# Patient Record
Sex: FEMALE | Race: WHITE | Employment: OTHER | URBAN - METROPOLITAN AREA
[De-identification: names, ages, dates, MRNs, and addresses within clinical notes are randomized per-mention and may not be internally consistent; named-entity substitution may affect disease eponyms.]

---

## 2017-04-20 PROBLEM — R07.9 CHEST PAIN: Status: ACTIVE | Noted: 2017-04-20

## 2017-04-20 PROBLEM — E03.9 HYPOTHYROID: Status: ACTIVE | Noted: 2017-04-20

## 2017-04-20 PROBLEM — I25.10 CAD (CORONARY ARTERY DISEASE): Status: ACTIVE | Noted: 2017-04-20

## 2017-04-20 PROBLEM — E66.9 OBESITY: Status: ACTIVE | Noted: 2017-04-20

## 2017-04-20 PROBLEM — E78.5 HLD (HYPERLIPIDEMIA): Status: ACTIVE | Noted: 2017-04-20

## 2019-11-27 ENCOUNTER — HOSPITAL ENCOUNTER (OUTPATIENT)
Dept: OPERATING ROOM | Age: 69
Setting detail: OUTPATIENT SURGERY
Discharge: HOME | End: 2019-11-27
Payer: MEDICARE

## 2019-11-27 VITALS
HEART RATE: 85 BPM | OXYGEN SATURATION: 99 % | RESPIRATION RATE: 16 BRPM | DIASTOLIC BLOOD PRESSURE: 83 MMHG | SYSTOLIC BLOOD PRESSURE: 139 MMHG

## 2019-11-27 PROCEDURE — 2500000003 HC RX 250 WO HCPCS: Performed by: OPHTHALMOLOGY

## 2019-11-27 PROCEDURE — 6370000000 HC RX 637 (ALT 250 FOR IP): Performed by: OPHTHALMOLOGY

## 2019-11-27 PROCEDURE — 65855 TRABECULOPLASTY LASER SURG: CPT

## 2019-11-27 RX ORDER — PILOCARPINE HYDROCHLORIDE 20 MG/ML
1 SOLUTION/ DROPS OPHTHALMIC ONCE
Status: COMPLETED | OUTPATIENT
Start: 2019-11-27 | End: 2019-11-27

## 2019-11-27 RX ORDER — PROPARACAINE HYDROCHLORIDE 5 MG/ML
1 SOLUTION/ DROPS OPHTHALMIC ONCE
Status: COMPLETED | OUTPATIENT
Start: 2019-11-27 | End: 2019-11-27

## 2019-11-27 RX ADMIN — APRACLONIDINE HYDROCHLORIDE 1 DROP: 10 SOLUTION/ DROPS OPHTHALMIC at 12:24

## 2019-11-27 RX ADMIN — PILOCARPINE HYDROCHLORIDE 1 DROP: 20 SOLUTION/ DROPS OPHTHALMIC at 12:25

## 2019-11-27 RX ADMIN — PROPARACAINE HYDROCHLORIDE 1 DROP: 5 SOLUTION/ DROPS OPHTHALMIC at 13:09

## 2019-11-27 RX ADMIN — BROMFENAC SODIUM 1 DROP: 0.7 SOLUTION/ DROPS OPHTHALMIC at 13:14

## 2019-12-11 ENCOUNTER — HOSPITAL ENCOUNTER (OUTPATIENT)
Dept: OPERATING ROOM | Age: 69
Setting detail: OUTPATIENT SURGERY
Discharge: HOME | End: 2019-12-11
Payer: MEDICARE

## 2019-12-11 VITALS
SYSTOLIC BLOOD PRESSURE: 125 MMHG | HEART RATE: 84 BPM | OXYGEN SATURATION: 95 % | DIASTOLIC BLOOD PRESSURE: 82 MMHG | RESPIRATION RATE: 18 BRPM

## 2019-12-11 PROCEDURE — 65855 TRABECULOPLASTY LASER SURG: CPT

## 2019-12-11 PROCEDURE — 6370000000 HC RX 637 (ALT 250 FOR IP): Performed by: OPHTHALMOLOGY

## 2019-12-11 PROCEDURE — 2500000003 HC RX 250 WO HCPCS: Performed by: OPHTHALMOLOGY

## 2019-12-11 RX ORDER — PILOCARPINE HYDROCHLORIDE 20 MG/ML
1 SOLUTION/ DROPS OPHTHALMIC ONCE
Status: COMPLETED | OUTPATIENT
Start: 2019-12-11 | End: 2019-12-11

## 2019-12-11 RX ORDER — PROPARACAINE HYDROCHLORIDE 5 MG/ML
1 SOLUTION/ DROPS OPHTHALMIC ONCE
Status: COMPLETED | OUTPATIENT
Start: 2019-12-11 | End: 2019-12-11

## 2019-12-11 RX ADMIN — PILOCARPINE HYDROCHLORIDE 1 DROP: 20 SOLUTION/ DROPS OPHTHALMIC at 14:11

## 2019-12-11 RX ADMIN — PROPARACAINE HYDROCHLORIDE 1 DROP: 5 SOLUTION/ DROPS OPHTHALMIC at 14:28

## 2019-12-11 RX ADMIN — BROMFENAC SODIUM 1 DROP: 0.7 SOLUTION/ DROPS OPHTHALMIC at 14:33

## 2019-12-11 RX ADMIN — APRACLONIDINE HYDROCHLORIDE 1 DROP: 10 SOLUTION/ DROPS OPHTHALMIC at 14:05

## 2025-02-18 ENCOUNTER — OFFICE VISIT (OUTPATIENT)
Age: 75
End: 2025-02-18

## 2025-02-18 VITALS
TEMPERATURE: 97.1 F | SYSTOLIC BLOOD PRESSURE: 122 MMHG | OXYGEN SATURATION: 95 % | DIASTOLIC BLOOD PRESSURE: 68 MMHG | HEART RATE: 110 BPM

## 2025-02-18 DIAGNOSIS — J20.9 ACUTE BRONCHITIS, UNSPECIFIED ORGANISM: Primary | ICD-10-CM

## 2025-02-18 DIAGNOSIS — R05.1 ACUTE COUGH: ICD-10-CM

## 2025-02-18 DIAGNOSIS — J06.9 ACUTE UPPER RESPIRATORY INFECTION: ICD-10-CM

## 2025-02-18 RX ORDER — ROSUVASTATIN CALCIUM 20 MG/1
20 TABLET, COATED ORAL EVERY EVENING
Status: ON HOLD | COMMUNITY
Start: 2025-02-06 | End: 2025-08-05

## 2025-02-18 RX ORDER — BENZONATATE 100 MG/1
100-200 CAPSULE ORAL 3 TIMES DAILY PRN
Qty: 60 CAPSULE | Refills: 0 | Status: ON HOLD | OUTPATIENT
Start: 2025-02-18 | End: 2025-02-28

## 2025-02-18 RX ORDER — PREDNISONE 20 MG/1
20 TABLET ORAL 2 TIMES DAILY
Qty: 10 TABLET | Refills: 0 | Status: ON HOLD | OUTPATIENT
Start: 2025-02-18 | End: 2025-02-23

## 2025-02-18 RX ORDER — TIRZEPATIDE 5 MG/.5ML
INJECTION, SOLUTION SUBCUTANEOUS
Status: ON HOLD | COMMUNITY
Start: 2025-01-16

## 2025-02-18 RX ORDER — OXYCODONE HYDROCHLORIDE 5 MG/1
TABLET ORAL
Status: ON HOLD | COMMUNITY
Start: 2024-12-19

## 2025-02-18 RX ORDER — TRAZODONE HYDROCHLORIDE 50 MG/1
TABLET ORAL
Status: ON HOLD | COMMUNITY
Start: 2025-01-18

## 2025-02-18 RX ORDER — BRIMONIDINE TARTRATE 2 MG/ML
SOLUTION/ DROPS OPHTHALMIC
Status: ON HOLD | COMMUNITY
Start: 2024-12-17

## 2025-02-18 RX ORDER — LATANOPROST 50 UG/ML
SOLUTION/ DROPS OPHTHALMIC
Status: ON HOLD | COMMUNITY

## 2025-02-18 ASSESSMENT — ENCOUNTER SYMPTOMS
EYES NEGATIVE: 1
GASTROINTESTINAL NEGATIVE: 1
RHINORRHEA: 1
COUGH: 1

## 2025-02-18 NOTE — PROGRESS NOTES
Muriel Thomas (: 1950) is a 74 y.o. female, New patient, here for evaluation of the following chief complaint(s):  Cough (Pt states cough x 10 days now/Productive cough/Short of breath with coughing ) and Nasal Congestion        ASSESSMENT/PLAN:    ICD-10-CM    1. Acute bronchitis, unspecified organism  J20.9 amoxicillin-clavulanate (AUGMENTIN) 875-125 MG per tablet     predniSONE (DELTASONE) 20 MG tablet      2. Acute cough  R05.1 benzonatate (TESSALON) 100 MG capsule      3. Acute upper respiratory infection  J06.9           Acute Bronchitis  Augmentin prescribed to treat the URI - take with food  Prednisone prescribed to help with the airway restriction  Benzonatate prescribed to help with cough  Suggested OTC remedies for symptom management:  Discussed PCP follow up for persisting or worsening symptoms, or to return to the clinic if unable to obtain PCP follow up for worsening symptoms.  The patient tolerated their visit well. A time was given to answer questions and a plan was established, proposed, and was agreed upon. Reviewed AVS with treatment instructions and answered questions - patient acknowledges understanding and agreement with the discussed treatment plan and AVS instructions.      SUBJECTIVE/OBJECTIVE:  HPI:   74 y.o. female presents for complaint of cough x 10 days.    Admits cough has been consistent and productive since symptoms began. Did have a fever at beginning of illness. Cough has continued and now voice is very hoarse. Productive cough with green/yellow sputum.   Denies current fever, nausea, vomiting, shortness of breath, chest pain.     Mucinex makes symptoms better.  Talking, laying down makes symptoms worse.    Has attempted Mucinex for symptoms with mild improvement    Pt provided HPI by themself - pt considered to be a reliable historian.        History provided by:  Patient  Cough  This is a new problem. The current episode started 1 to 4 weeks ago. The problem has been

## 2025-02-20 ENCOUNTER — OFFICE VISIT (OUTPATIENT)
Age: 75
End: 2025-02-20

## 2025-02-20 ENCOUNTER — HOSPITAL ENCOUNTER (INPATIENT)
Age: 75
LOS: 10 days | Discharge: HOME | DRG: 871 | End: 2025-03-02
Attending: STUDENT IN AN ORGANIZED HEALTH CARE EDUCATION/TRAINING PROGRAM | Admitting: STUDENT IN AN ORGANIZED HEALTH CARE EDUCATION/TRAINING PROGRAM
Payer: MEDICARE

## 2025-02-20 ENCOUNTER — APPOINTMENT (OUTPATIENT)
Dept: GENERAL RADIOLOGY | Age: 75
DRG: 871 | End: 2025-02-20
Payer: MEDICARE

## 2025-02-20 VITALS
SYSTOLIC BLOOD PRESSURE: 138 MMHG | TEMPERATURE: 97.2 F | HEART RATE: 107 BPM | OXYGEN SATURATION: 84 % | DIASTOLIC BLOOD PRESSURE: 81 MMHG

## 2025-02-20 DIAGNOSIS — R06.02 SHORTNESS OF BREATH: ICD-10-CM

## 2025-02-20 DIAGNOSIS — J96.01 ACUTE RESPIRATORY FAILURE WITH HYPOXIA (HCC): ICD-10-CM

## 2025-02-20 DIAGNOSIS — Z71.89 COMPLEX CARE COORDINATION: ICD-10-CM

## 2025-02-20 DIAGNOSIS — J18.9 PNEUMONIA DUE TO INFECTIOUS ORGANISM, UNSPECIFIED LATERALITY, UNSPECIFIED PART OF LUNG: Primary | ICD-10-CM

## 2025-02-20 DIAGNOSIS — R09.02 HYPOXEMIA: Primary | ICD-10-CM

## 2025-02-20 PROBLEM — J15.9 COMMUNITY ACQUIRED BACTERIAL PNEUMONIA: Status: ACTIVE | Noted: 2025-02-20

## 2025-02-20 LAB
ALBUMIN SERPL-MCNC: 3.1 G/DL (ref 3.4–5)
ALBUMIN/GLOB SERPL: 0.7 {RATIO} (ref 1.1–2.2)
ALP SERPL-CCNC: 127 U/L (ref 40–129)
ALT SERPL-CCNC: 144 U/L (ref 10–40)
ANION GAP SERPL CALCULATED.3IONS-SCNC: 10 MMOL/L (ref 3–16)
ANISOCYTOSIS BLD QL SMEAR: ABNORMAL
AST SERPL-CCNC: 162 U/L (ref 15–37)
BASOPHILS # BLD: 0 K/UL (ref 0–0.2)
BASOPHILS NFR BLD: 0 %
BILIRUB SERPL-MCNC: 0.3 MG/DL (ref 0–1)
BUN SERPL-MCNC: 14 MG/DL (ref 7–20)
CALCIUM SERPL-MCNC: 8.9 MG/DL (ref 8.3–10.6)
CHLORIDE SERPL-SCNC: 97 MMOL/L (ref 99–110)
CO2 SERPL-SCNC: 28 MMOL/L (ref 21–32)
CREAT SERPL-MCNC: 0.8 MG/DL (ref 0.6–1.2)
DEPRECATED RDW RBC AUTO: 12.9 % (ref 12.4–15.4)
EOSINOPHIL # BLD: 0 K/UL (ref 0–0.6)
EOSINOPHIL NFR BLD: 0 %
FLUAV RNA RESP QL NAA+PROBE: NOT DETECTED
FLUBV RNA RESP QL NAA+PROBE: NOT DETECTED
GFR SERPLBLD CREATININE-BSD FMLA CKD-EPI: 78 ML/MIN/{1.73_M2}
GLUCOSE SERPL-MCNC: 112 MG/DL (ref 70–99)
HCT VFR BLD AUTO: 39.7 % (ref 36–48)
HGB BLD-MCNC: 13.5 G/DL (ref 12–16)
LACTATE BLDV-SCNC: 1 MMOL/L (ref 0.4–1.9)
LACTATE BLDV-SCNC: 1.4 MMOL/L (ref 0.4–1.9)
LYMPHOCYTES # BLD: 1.2 K/UL (ref 1–5.1)
LYMPHOCYTES NFR BLD: 8 %
MACROCYTES BLD QL SMEAR: ABNORMAL
MCH RBC QN AUTO: 32.2 PG (ref 26–34)
MCHC RBC AUTO-ENTMCNC: 34.1 G/DL (ref 31–36)
MCV RBC AUTO: 94.5 FL (ref 80–100)
MICROCYTES BLD QL SMEAR: ABNORMAL
MONOCYTES # BLD: 0.7 K/UL (ref 0–1.3)
MONOCYTES NFR BLD: 5 %
NEUTROPHILS # BLD: 11.8 K/UL (ref 1.7–7.7)
NEUTROPHILS NFR BLD: 73 %
NEUTS BAND NFR BLD MANUAL: 13 % (ref 0–7)
OVALOCYTES BLD QL SMEAR: ABNORMAL
PLATELET # BLD AUTO: 626 K/UL (ref 135–450)
PLATELET BLD QL SMEAR: ABNORMAL
PMV BLD AUTO: 6.8 FL (ref 5–10.5)
POIKILOCYTOSIS BLD QL SMEAR: ABNORMAL
POLYCHROMASIA BLD QL SMEAR: ABNORMAL
POTASSIUM SERPL-SCNC: 4.2 MMOL/L (ref 3.5–5.1)
PROT SERPL-MCNC: 7.3 G/DL (ref 6.4–8.2)
RBC # BLD AUTO: 4.21 M/UL (ref 4–5.2)
SARS-COV-2 RNA RESP QL NAA+PROBE: NOT DETECTED
SLIDE REVIEW: ABNORMAL
SODIUM SERPL-SCNC: 135 MMOL/L (ref 136–145)
SPHEROCYTES BLD QL SMEAR: ABNORMAL
TROPONIN, HIGH SENSITIVITY: 10 NG/L (ref 0–14)
VARIANT LYMPHS NFR BLD MANUAL: 1 % (ref 0–6)
WBC # BLD AUTO: 13.7 K/UL (ref 4–11)

## 2025-02-20 PROCEDURE — 2500000003 HC RX 250 WO HCPCS: Performed by: STUDENT IN AN ORGANIZED HEALTH CARE EDUCATION/TRAINING PROGRAM

## 2025-02-20 PROCEDURE — 6370000000 HC RX 637 (ALT 250 FOR IP): Performed by: STUDENT IN AN ORGANIZED HEALTH CARE EDUCATION/TRAINING PROGRAM

## 2025-02-20 PROCEDURE — 87040 BLOOD CULTURE FOR BACTERIA: CPT

## 2025-02-20 PROCEDURE — 6360000002 HC RX W HCPCS: Performed by: STUDENT IN AN ORGANIZED HEALTH CARE EDUCATION/TRAINING PROGRAM

## 2025-02-20 PROCEDURE — 1200000000 HC SEMI PRIVATE

## 2025-02-20 PROCEDURE — 2580000003 HC RX 258: Performed by: STUDENT IN AN ORGANIZED HEALTH CARE EDUCATION/TRAINING PROGRAM

## 2025-02-20 PROCEDURE — 71045 X-RAY EXAM CHEST 1 VIEW: CPT

## 2025-02-20 PROCEDURE — 87636 SARSCOV2 & INF A&B AMP PRB: CPT

## 2025-02-20 PROCEDURE — 80053 COMPREHEN METABOLIC PANEL: CPT

## 2025-02-20 PROCEDURE — 87150 DNA/RNA AMPLIFIED PROBE: CPT

## 2025-02-20 PROCEDURE — 36415 COLL VENOUS BLD VENIPUNCTURE: CPT

## 2025-02-20 PROCEDURE — 99285 EMERGENCY DEPT VISIT HI MDM: CPT

## 2025-02-20 PROCEDURE — 93005 ELECTROCARDIOGRAM TRACING: CPT | Performed by: STUDENT IN AN ORGANIZED HEALTH CARE EDUCATION/TRAINING PROGRAM

## 2025-02-20 PROCEDURE — 87070 CULTURE OTHR SPECIMN AEROBIC: CPT

## 2025-02-20 PROCEDURE — 84484 ASSAY OF TROPONIN QUANT: CPT

## 2025-02-20 PROCEDURE — 85025 COMPLETE CBC W/AUTO DIFF WBC: CPT

## 2025-02-20 PROCEDURE — 83605 ASSAY OF LACTIC ACID: CPT

## 2025-02-20 PROCEDURE — 94640 AIRWAY INHALATION TREATMENT: CPT

## 2025-02-20 RX ORDER — IPRATROPIUM BROMIDE AND ALBUTEROL SULFATE 2.5; .5 MG/3ML; MG/3ML
1 SOLUTION RESPIRATORY (INHALATION) ONCE
Status: COMPLETED | OUTPATIENT
Start: 2025-02-20 | End: 2025-02-20

## 2025-02-20 RX ORDER — SODIUM CHLORIDE 9 MG/ML
INJECTION, SOLUTION INTRAVENOUS PRN
Status: DISCONTINUED | OUTPATIENT
Start: 2025-02-20 | End: 2025-03-02 | Stop reason: HOSPADM

## 2025-02-20 RX ORDER — ONDANSETRON 2 MG/ML
4 INJECTION INTRAMUSCULAR; INTRAVENOUS EVERY 6 HOURS PRN
Status: DISCONTINUED | OUTPATIENT
Start: 2025-02-20 | End: 2025-03-02 | Stop reason: HOSPADM

## 2025-02-20 RX ORDER — ENOXAPARIN SODIUM 100 MG/ML
30 INJECTION SUBCUTANEOUS 2 TIMES DAILY
Status: DISCONTINUED | OUTPATIENT
Start: 2025-02-20 | End: 2025-03-02 | Stop reason: HOSPADM

## 2025-02-20 RX ORDER — SODIUM CHLORIDE 0.9 % (FLUSH) 0.9 %
5-40 SYRINGE (ML) INJECTION PRN
Status: DISCONTINUED | OUTPATIENT
Start: 2025-02-20 | End: 2025-03-02 | Stop reason: HOSPADM

## 2025-02-20 RX ORDER — POTASSIUM CHLORIDE 7.45 MG/ML
10 INJECTION INTRAVENOUS PRN
Status: DISCONTINUED | OUTPATIENT
Start: 2025-02-20 | End: 2025-02-21

## 2025-02-20 RX ORDER — POTASSIUM CHLORIDE 1500 MG/1
40 TABLET, EXTENDED RELEASE ORAL PRN
Status: DISCONTINUED | OUTPATIENT
Start: 2025-02-20 | End: 2025-02-21

## 2025-02-20 RX ORDER — ACETAMINOPHEN 650 MG/1
650 SUPPOSITORY RECTAL EVERY 6 HOURS PRN
Status: DISCONTINUED | OUTPATIENT
Start: 2025-02-20 | End: 2025-03-02 | Stop reason: HOSPADM

## 2025-02-20 RX ORDER — GUAIFENESIN 600 MG/1
1200 TABLET, EXTENDED RELEASE ORAL 2 TIMES DAILY
Status: DISCONTINUED | OUTPATIENT
Start: 2025-02-20 | End: 2025-03-02 | Stop reason: HOSPADM

## 2025-02-20 RX ORDER — ONDANSETRON 4 MG/1
4 TABLET, ORALLY DISINTEGRATING ORAL EVERY 8 HOURS PRN
Status: DISCONTINUED | OUTPATIENT
Start: 2025-02-20 | End: 2025-03-02 | Stop reason: HOSPADM

## 2025-02-20 RX ORDER — ACETAMINOPHEN 325 MG/1
650 TABLET ORAL EVERY 6 HOURS PRN
Status: DISCONTINUED | OUTPATIENT
Start: 2025-02-20 | End: 2025-03-02 | Stop reason: HOSPADM

## 2025-02-20 RX ORDER — POLYETHYLENE GLYCOL 3350 17 G/17G
17 POWDER, FOR SOLUTION ORAL DAILY PRN
Status: DISCONTINUED | OUTPATIENT
Start: 2025-02-20 | End: 2025-03-02 | Stop reason: HOSPADM

## 2025-02-20 RX ORDER — IPRATROPIUM BROMIDE AND ALBUTEROL SULFATE 2.5; .5 MG/3ML; MG/3ML
1 SOLUTION RESPIRATORY (INHALATION)
Status: DISCONTINUED | OUTPATIENT
Start: 2025-02-21 | End: 2025-03-02 | Stop reason: HOSPADM

## 2025-02-20 RX ORDER — 0.9 % SODIUM CHLORIDE 0.9 %
30 INTRAVENOUS SOLUTION INTRAVENOUS ONCE
Status: COMPLETED | OUTPATIENT
Start: 2025-02-20 | End: 2025-02-20

## 2025-02-20 RX ORDER — SODIUM CHLORIDE 0.9 % (FLUSH) 0.9 %
5-40 SYRINGE (ML) INJECTION EVERY 12 HOURS SCHEDULED
Status: DISCONTINUED | OUTPATIENT
Start: 2025-02-20 | End: 2025-03-02 | Stop reason: HOSPADM

## 2025-02-20 RX ORDER — MAGNESIUM SULFATE IN WATER 40 MG/ML
2000 INJECTION, SOLUTION INTRAVENOUS PRN
Status: DISCONTINUED | OUTPATIENT
Start: 2025-02-20 | End: 2025-02-21

## 2025-02-20 RX ADMIN — GUAIFENESIN 1200 MG: 600 TABLET ORAL at 22:55

## 2025-02-20 RX ADMIN — WATER 1000 MG: 1 INJECTION INTRAMUSCULAR; INTRAVENOUS; SUBCUTANEOUS at 20:47

## 2025-02-20 RX ADMIN — ENOXAPARIN SODIUM 30 MG: 100 INJECTION SUBCUTANEOUS at 22:55

## 2025-02-20 RX ADMIN — SODIUM CHLORIDE, PRESERVATIVE FREE 10 ML: 5 INJECTION INTRAVENOUS at 22:55

## 2025-02-20 RX ADMIN — AZITHROMYCIN MONOHYDRATE 500 MG: 500 INJECTION, POWDER, LYOPHILIZED, FOR SOLUTION INTRAVENOUS at 20:52

## 2025-02-20 RX ADMIN — SODIUM CHLORIDE 1848 ML: 9 INJECTION, SOLUTION INTRAVENOUS at 20:52

## 2025-02-20 RX ADMIN — IPRATROPIUM BROMIDE AND ALBUTEROL SULFATE 1 DOSE: 2.5; .5 SOLUTION RESPIRATORY (INHALATION) at 18:40

## 2025-02-20 ASSESSMENT — PAIN - FUNCTIONAL ASSESSMENT: PAIN_FUNCTIONAL_ASSESSMENT: NONE - DENIES PAIN

## 2025-02-20 ASSESSMENT — LIFESTYLE VARIABLES
HOW MANY STANDARD DRINKS CONTAINING ALCOHOL DO YOU HAVE ON A TYPICAL DAY: PATIENT DOES NOT DRINK
HOW OFTEN DO YOU HAVE A DRINK CONTAINING ALCOHOL: NEVER

## 2025-02-20 NOTE — PROGRESS NOTES
Muriel Thomas (:  1950) is a 75 y.o. female,Established patient, here for evaluation of the following chief complaint(s):  Shortness of Breath (Seen on Tuesday, now presenting with worsening SOB.) and Care Coordination (EMS called to transfer patient to the ER)      ASSESSMENT/PLAN:    ICD-10-CM    1. Hypoxemia  R09.02       2. Shortness of breath  R06.02       3. Complex care coordination  Z71.89         EMS called , arrived and transported  patient to the ER,       Differential diagnosis includes but not limited to---   pneumonia,  bacterial bronchitis... Viral URI...pleursy ..exacerbation of .asthma.or COPD (but patient has no history of either)   ..interstitial lung disease..  ..PE(DVT)...heart disease     SUBJECTIVE/OBJECTIVE:  Patient presents with:  Shortness of Breath: Seen on  for bronchitis (on antibiotic), now presenting with worsening SOB.  No reported chest pain or dizziness,  no N/V    Son says she has to stop eating to catch her breath or stop to breath when getting dressed                  History provided by:  Patient and relative   used: No        Vitals:    25 1724   BP: 138/81   Pulse: (!) 107   Temp: 97.2 °F (36.2 °C)   TempSrc: Infrared   SpO2: (!) 84%       Review of Systems   Constitutional:  Negative for fever.   HENT:  Positive for congestion. Negative for ear pain and sore throat.    Respiratory:  Positive for cough and shortness of breath.    Cardiovascular:  Negative for chest pain and palpitations.   Gastrointestinal:  Negative for abdominal pain, nausea and vomiting.   Musculoskeletal:  Negative for myalgias.   Neurological:  Negative for dizziness.       Physical Exam    Physical  Vitals signs: reviewed  Constitutional:  appearance: well nourished ..  Uncomfortable, whispering, seems out of breath   Eyes:                 Pupil: equal-round-reactive to light, no photophobia, EOMI            Cornea: clear            Sclera: clear, non

## 2025-02-20 NOTE — ED PROVIDER NOTES
[facility] C3 TELE/MED SURG/ONC     EMERGENCY DEPARTMENT ENCOUNTER            Pt Name: Muriel Thomas   MRN: [id number]   Birthdate 1950   Date of evaluation: 2/20/2025   Provider: Maureen Guerrier MD   PCP: McGilligan, Becky, MD   Note Started: 5:59 PM EST 2/20/25          CHIEF COMPLAINT     Chief Complaint   Patient presents with    Shortness of Breath     Patient reports diagnosed with bronchitis a few days ago by urgent care, started on augmentin, went to urgent care today for increased SOB and congestion. Per EMS pt was 85% on RA, denies pulmonary history. Pt was 93% on RA at rest upon arrival. Pt diagnosed with FLU last week    Influenza      HISTORY OF PRESENT ILLNESS:   History from : Family (son)    Limitations to history : None     Muriel Thomas is a 75 y.o. female who presents complaining of shortness of breath.  Patient was diagnosed with the flu last week.  Has been having worsening shortness of breath since then.  Saw urgent care few days ago and was treated with antibiotics, steroids for bronchitis and has had worsening shortness of breath.  Went back to urgent care today for ongoing shortness of breath and wheezing.  Was sent to the ER for oxygen saturation of 85% on room air.    Nursing Notes were all reviewed and agreed with, or any disagreements were addressed in the HPI.     REVIEW OF SYSTEMS :    Positives and Pertinent negatives as per HPI.      MEDICAL HISTORY   has a past medical history of Acute MI (HCC), Hyperlipidemia, Hypothyroid, and Pre-diabetes (2009).    Past Surgical History:   Procedure Laterality Date    BREAST SURGERY  1996    right lumpectomy    COLONOSCOPY  02/13/2004    hemorrhoids    HYSTERECTOMY (CERVIX STATUS UNKNOWN)      OVARIAN CYST SURGERY  1992    right     TONSILLECTOMY  1957      CURRENTMEDICATIONS       Current Discharge Medication List        CONTINUE these medications which have NOT CHANGED    Details   rosuvastatin (CRESTOR) 20 MG tablet Take 1 tablet by  range)   ondansetron (ZOFRAN-ODT) disintegrating tablet 4 mg (has no administration in time range)     Or   ondansetron (ZOFRAN) injection 4 mg (has no administration in time range)   polyethylene glycol (GLYCOLAX) packet 17 g (has no administration in time range)   acetaminophen (TYLENOL) tablet 650 mg (has no administration in time range)     Or   acetaminophen (TYLENOL) suppository 650 mg (has no administration in time range)   ipratropium 0.5 mg-albuterol 2.5 mg (DUONEB) nebulizer solution 1 Dose (1 Dose Inhalation Given 2/20/25 5474)        CONSULTS:   None   Discussion with Other Professionals: None   Social Determinants: None   Chronic Conditions: Hypothyroidism, hyperlipidemia  Records Reviewed: NA          I am the Primary Clinician of Record.        FINAL IMPRESSION    1. Pneumonia due to infectious organism, unspecified laterality, unspecified part of lung    2. Acute respiratory failure with hypoxia (HCC)           DISPOSITION/PLAN:       Pt admitted to hospital for further workup.    Disposition Considerations: Admitted    PATIENT REFERRED TO:   No follow-up provider specified.     DISCHARGE MEDICATIONS:   Current Discharge Medication List           DISCONTINUED MEDICATIONS:   Current Discharge Medication List                 (Please note that portions of this note were completed with a voice recognition program.  Efforts were made to edit the dictations but occasionally words are mis-transcribed.)       Maureen Guerrier MD (electronically signed)             Maureen Guerrier MD  02/20/25 1390

## 2025-02-21 LAB
ALBUMIN SERPL-MCNC: 2.5 G/DL (ref 3.4–5)
ALBUMIN/GLOB SERPL: 0.7 {RATIO} (ref 1.1–2.2)
ALP SERPL-CCNC: 99 U/L (ref 40–129)
ALT SERPL-CCNC: 113 U/L (ref 10–40)
ANION GAP SERPL CALCULATED.3IONS-SCNC: 9 MMOL/L (ref 3–16)
ANISOCYTOSIS BLD QL SMEAR: ABNORMAL
AST SERPL-CCNC: 114 U/L (ref 15–37)
BASOPHILS # BLD: 0 K/UL (ref 0–0.2)
BASOPHILS NFR BLD: 0 %
BILIRUB SERPL-MCNC: <0.2 MG/DL (ref 0–1)
BUN SERPL-MCNC: 14 MG/DL (ref 7–20)
CALCIUM SERPL-MCNC: 7.8 MG/DL (ref 8.3–10.6)
CHLORIDE SERPL-SCNC: 102 MMOL/L (ref 99–110)
CO2 SERPL-SCNC: 24 MMOL/L (ref 21–32)
CREAT SERPL-MCNC: 0.8 MG/DL (ref 0.6–1.2)
DEPRECATED RDW RBC AUTO: 13 % (ref 12.4–15.4)
EKG ATRIAL RATE: 88 BPM
EKG DIAGNOSIS: NORMAL
EKG P AXIS: 63 DEGREES
EKG P-R INTERVAL: 162 MS
EKG Q-T INTERVAL: 396 MS
EKG QRS DURATION: 74 MS
EKG QTC CALCULATION (BAZETT): 479 MS
EKG R AXIS: -1 DEGREES
EKG T AXIS: 34 DEGREES
EKG VENTRICULAR RATE: 88 BPM
EOSINOPHIL # BLD: 0.1 K/UL (ref 0–0.6)
EOSINOPHIL NFR BLD: 1 %
GFR SERPLBLD CREATININE-BSD FMLA CKD-EPI: 78 ML/MIN/{1.73_M2}
GLUCOSE SERPL-MCNC: 87 MG/DL (ref 70–99)
HCT VFR BLD AUTO: 37 % (ref 36–48)
HGB BLD-MCNC: 12.4 G/DL (ref 12–16)
LYMPHOCYTES # BLD: 2.5 K/UL (ref 1–5.1)
LYMPHOCYTES NFR BLD: 17 %
MACROCYTES BLD QL SMEAR: ABNORMAL
MCH RBC QN AUTO: 32.5 PG (ref 26–34)
MCHC RBC AUTO-ENTMCNC: 33.4 G/DL (ref 31–36)
MCV RBC AUTO: 97.4 FL (ref 80–100)
MICROCYTES BLD QL SMEAR: ABNORMAL
MONOCYTES # BLD: 1.5 K/UL (ref 0–1.3)
MONOCYTES NFR BLD: 10 %
NEUTROPHILS # BLD: 10.4 K/UL (ref 1.7–7.7)
NEUTROPHILS NFR BLD: 62 %
NEUTS BAND NFR BLD MANUAL: 10 % (ref 0–7)
OVALOCYTES BLD QL SMEAR: ABNORMAL
PLATELET # BLD AUTO: 562 K/UL (ref 135–450)
PLATELET BLD QL SMEAR: ABNORMAL
PMV BLD AUTO: 7.4 FL (ref 5–10.5)
POTASSIUM SERPL-SCNC: 3.8 MMOL/L (ref 3.5–5.1)
PROT SERPL-MCNC: 6 G/DL (ref 6.4–8.2)
RBC # BLD AUTO: 3.8 M/UL (ref 4–5.2)
SLIDE REVIEW: ABNORMAL
SODIUM SERPL-SCNC: 135 MMOL/L (ref 136–145)
WBC # BLD AUTO: 14.5 K/UL (ref 4–11)

## 2025-02-21 PROCEDURE — 85025 COMPLETE CBC W/AUTO DIFF WBC: CPT

## 2025-02-21 PROCEDURE — 6370000000 HC RX 637 (ALT 250 FOR IP): Performed by: STUDENT IN AN ORGANIZED HEALTH CARE EDUCATION/TRAINING PROGRAM

## 2025-02-21 PROCEDURE — 97166 OT EVAL MOD COMPLEX 45 MIN: CPT

## 2025-02-21 PROCEDURE — 2580000003 HC RX 258

## 2025-02-21 PROCEDURE — 80053 COMPREHEN METABOLIC PANEL: CPT

## 2025-02-21 PROCEDURE — 36415 COLL VENOUS BLD VENIPUNCTURE: CPT

## 2025-02-21 PROCEDURE — 97530 THERAPEUTIC ACTIVITIES: CPT

## 2025-02-21 PROCEDURE — 1200000000 HC SEMI PRIVATE

## 2025-02-21 PROCEDURE — 94761 N-INVAS EAR/PLS OXIMETRY MLT: CPT

## 2025-02-21 PROCEDURE — 97116 GAIT TRAINING THERAPY: CPT

## 2025-02-21 PROCEDURE — 6370000000 HC RX 637 (ALT 250 FOR IP)

## 2025-02-21 PROCEDURE — 94669 MECHANICAL CHEST WALL OSCILL: CPT

## 2025-02-21 PROCEDURE — 94640 AIRWAY INHALATION TREATMENT: CPT

## 2025-02-21 PROCEDURE — 6360000002 HC RX W HCPCS: Performed by: STUDENT IN AN ORGANIZED HEALTH CARE EDUCATION/TRAINING PROGRAM

## 2025-02-21 PROCEDURE — 2500000003 HC RX 250 WO HCPCS

## 2025-02-21 PROCEDURE — 97161 PT EVAL LOW COMPLEX 20 MIN: CPT

## 2025-02-21 PROCEDURE — 2500000003 HC RX 250 WO HCPCS: Performed by: STUDENT IN AN ORGANIZED HEALTH CARE EDUCATION/TRAINING PROGRAM

## 2025-02-21 PROCEDURE — 2700000000 HC OXYGEN THERAPY PER DAY

## 2025-02-21 PROCEDURE — 6360000002 HC RX W HCPCS

## 2025-02-21 PROCEDURE — 93010 ELECTROCARDIOGRAM REPORT: CPT | Performed by: INTERNAL MEDICINE

## 2025-02-21 RX ORDER — ROSUVASTATIN CALCIUM 10 MG/1
20 TABLET, COATED ORAL EVERY EVENING
Status: DISCONTINUED | OUTPATIENT
Start: 2025-02-21 | End: 2025-03-02 | Stop reason: HOSPADM

## 2025-02-21 RX ORDER — TRAZODONE HYDROCHLORIDE 50 MG/1
100 TABLET ORAL NIGHTLY PRN
Status: DISCONTINUED | OUTPATIENT
Start: 2025-02-21 | End: 2025-03-02 | Stop reason: HOSPADM

## 2025-02-21 RX ORDER — QUETIAPINE FUMARATE 100 MG/1
300 TABLET, FILM COATED ORAL DAILY
Status: DISCONTINUED | OUTPATIENT
Start: 2025-02-21 | End: 2025-03-02 | Stop reason: HOSPADM

## 2025-02-21 RX ORDER — LEVOTHYROXINE SODIUM 112 UG/1
112 TABLET ORAL DAILY
Status: DISCONTINUED | OUTPATIENT
Start: 2025-02-21 | End: 2025-03-02 | Stop reason: HOSPADM

## 2025-02-21 RX ORDER — POLYVINYL ALCOHOL 14 MG/ML
2 SOLUTION/ DROPS OPHTHALMIC PRN
Status: DISCONTINUED | OUTPATIENT
Start: 2025-02-21 | End: 2025-03-02 | Stop reason: HOSPADM

## 2025-02-21 RX ORDER — BENZONATATE 100 MG/1
100 CAPSULE ORAL 3 TIMES DAILY PRN
Status: DISCONTINUED | OUTPATIENT
Start: 2025-02-21 | End: 2025-03-02 | Stop reason: HOSPADM

## 2025-02-21 RX ORDER — BUPROPION HYDROCHLORIDE 150 MG/1
150 TABLET, EXTENDED RELEASE ORAL DAILY
Status: DISCONTINUED | OUTPATIENT
Start: 2025-02-21 | End: 2025-03-02 | Stop reason: HOSPADM

## 2025-02-21 RX ADMIN — ENOXAPARIN SODIUM 30 MG: 100 INJECTION SUBCUTANEOUS at 09:26

## 2025-02-21 RX ADMIN — SODIUM CHLORIDE, PRESERVATIVE FREE 10 ML: 5 INJECTION INTRAVENOUS at 09:26

## 2025-02-21 RX ADMIN — AZITHROMYCIN MONOHYDRATE 500 MG: 500 INJECTION, POWDER, LYOPHILIZED, FOR SOLUTION INTRAVENOUS at 20:27

## 2025-02-21 RX ADMIN — Medication 1 LOZENGE: at 13:31

## 2025-02-21 RX ADMIN — BUPROPION HYDROCHLORIDE 150 MG: 150 TABLET, FILM COATED, EXTENDED RELEASE ORAL at 09:25

## 2025-02-21 RX ADMIN — IPRATROPIUM BROMIDE AND ALBUTEROL SULFATE 1 DOSE: 2.5; .5 SOLUTION RESPIRATORY (INHALATION) at 20:20

## 2025-02-21 RX ADMIN — VENLAFAXINE HYDROCHLORIDE 225 MG: 150 CAPSULE, EXTENDED RELEASE ORAL at 09:25

## 2025-02-21 RX ADMIN — LEVOTHYROXINE SODIUM 112 MCG: 0.11 TABLET ORAL at 06:28

## 2025-02-21 RX ADMIN — IPRATROPIUM BROMIDE AND ALBUTEROL SULFATE 1 DOSE: 2.5; .5 SOLUTION RESPIRATORY (INHALATION) at 07:47

## 2025-02-21 RX ADMIN — ENOXAPARIN SODIUM 30 MG: 100 INJECTION SUBCUTANEOUS at 20:13

## 2025-02-21 RX ADMIN — ACETAMINOPHEN 650 MG: 325 TABLET ORAL at 13:29

## 2025-02-21 RX ADMIN — ROSUVASTATIN CALCIUM 20 MG: 10 TABLET, FILM COATED ORAL at 17:34

## 2025-02-21 RX ADMIN — QUETIAPINE FUMARATE 300 MG: 100 TABLET ORAL at 20:18

## 2025-02-21 RX ADMIN — ACETAMINOPHEN 650 MG: 325 TABLET ORAL at 20:18

## 2025-02-21 RX ADMIN — GUAIFENESIN 1200 MG: 600 TABLET ORAL at 09:26

## 2025-02-21 RX ADMIN — IPRATROPIUM BROMIDE AND ALBUTEROL SULFATE 1 DOSE: 2.5; .5 SOLUTION RESPIRATORY (INHALATION) at 15:48

## 2025-02-21 RX ADMIN — GUAIFENESIN 1200 MG: 600 TABLET ORAL at 20:13

## 2025-02-21 RX ADMIN — BENZONATATE 100 MG: 100 CAPSULE ORAL at 06:40

## 2025-02-21 RX ADMIN — WATER 1000 MG: 1 INJECTION INTRAMUSCULAR; INTRAVENOUS; SUBCUTANEOUS at 20:28

## 2025-02-21 RX ADMIN — IPRATROPIUM BROMIDE AND ALBUTEROL SULFATE 1 DOSE: 2.5; .5 SOLUTION RESPIRATORY (INHALATION) at 11:28

## 2025-02-21 ASSESSMENT — PAIN SCALES - GENERAL: PAINLEVEL_OUTOF10: 5

## 2025-02-21 ASSESSMENT — ENCOUNTER SYMPTOMS
COUGH: 1
VOMITING: 0
SHORTNESS OF BREATH: 1
ABDOMINAL PAIN: 0
SORE THROAT: 0
NAUSEA: 0

## 2025-02-21 NOTE — ED NOTES
Muriel Thomas is a 75 y.o. female admitted for  Principal Problem:    Community acquired bacterial pneumonia  Resolved Problems:    * No resolved hospital problems. *  .   Patient Home via EMS transportation with   Chief Complaint   Patient presents with    Shortness of Breath     Patient reports diagnosed with bronchitis a few days ago by urgent care, started on augmentin, went to urgent care today for increased SOB and congestion. Per EMS pt was 85% on RA, denies pulmonary history. Pt was 93% on RA at rest upon arrival. Pt diagnosed with FLU last week    Influenza   .  Patient is alert and Person, Place, Time, and Situation  Patient's baseline mobility: Baseline Mobility: Independent   Code Status: Prior   Cardiac Rhythm: Cardiac Rhythm: Sinus rhythm     Is patient on baseline Oxygen: no how many Liters:   Abnormal Assessment Findings:     Isolation:       NIH Score:    C-SSRS: Risk of Suicide: No Risk  Bedside swallow:        Active LDA's:   Peripheral IV 02/20/25 Left;Proximal Forearm (Active)   Site Assessment Clean, dry & intact 02/20/25 1847   Line Status Flushed;Brisk blood return;Normal saline locked;Specimen collected 02/20/25 1847   Line Care Cap changed 02/20/25 1847   Phlebitis Assessment No symptoms 02/20/25 1847   Infiltration Assessment 0 02/20/25 1847   Alcohol Cap Used No 02/20/25 1847   Dressing Status Clean, dry & intact 02/20/25 1847   Dressing Type Transparent 02/20/25 1847   Dressing Intervention New 02/20/25 1847         Family/Caregiver Present no Any Concerns: no   Restraints no  Sitter no         Vitals:      Vitals:    02/20/25 1840 02/20/25 1905 02/20/25 1936 02/20/25 2035   BP:  114/65 135/63 136/64   Pulse: 96 83 86 85   Resp: 18 17 20 19   Temp:       TempSrc:       SpO2: 96% 93% 91% 90%   Weight:       Height:           Last documented pain score (0-10 scale)    Pain medication administered No.    Pertinent or High Risk Medications/Drips: .    Pending Blood Product Administration:      Abnormal labs:   Abnormal Labs Reviewed   CBC WITH AUTO DIFFERENTIAL - Abnormal; Notable for the following components:       Result Value    WBC 13.7 (*)     Platelets 626 (*)     Neutrophils Absolute 11.8 (*)     Bands Relative 13 (*)     Anisocytosis Occasional (*)     Macrocytes Occasional (*)     Microcytes Occasional (*)     Polychromasia Occasional (*)     Poikilocytes Occasional (*)     Ovalocytes Occasional (*)     Spherocytes Occasional (*)     All other components within normal limits   COMPREHENSIVE METABOLIC PANEL W/ REFLEX TO MG FOR LOW K - Abnormal; Notable for the following components:    Sodium 135 (*)     Chloride 97 (*)     Glucose 112 (*)     Albumin 3.1 (*)     Albumin/Globulin Ratio 0.7 (*)      (*)      (*)     All other components within normal limits     Critical values: See imaging   Intervention for critical value(s):     Abnormal Imaging:              You may also review the ED PT Care Timeline found under the Summary Tab, ED Encounter Summary, Timeline Reports, ED Patient Care Timeline.     Recommendation    Pending orders/Uncompleted orders to hand off:      Additional Comments:   If any further questions, please [place] RN at [phone]

## 2025-02-21 NOTE — PROGRESS NOTES
[facility] Medicine Progress Note  V 1.6      Date of Admission: 2/20/2025    Hospital Day: 2      Chief Admission Complaint:  cough, congestion    Subjective:  reports productive cough, dyspnea.  Denies chest pain, fever, chills    Presenting Admission History: \"75 y.o. female with past medical history of anxiety, depression, hyperlipidemia, hypothyroidism presented to emergency department with chief complaint of cough, congestion.  Patient states that she has had symptoms for the last 2 weeks.  Her son and grandson were recently diagnosed with influenza A and she thought that the symptoms she was having was because of that.  After symptoms did not resolve after a week she was evaluated and was started on Augmentin and steroids for possible bronchitis.  Patient states that she continue to take these medications as directed but was not getting any better.  When patient was too weak to complete ADLs and did not have an appetite her son had her come into emergency department for evaluation.  Patient does state that she has had intermittent sweating but has not recorded a temperature.  Does endorse some headache and dizziness.  States that when she has a significant cough that there is chest pain.  Denies any GI,  symptoms.  Denies lower extremity edema\"    Assessment/Plan:      Current Principal Problem:  Community acquired bacterial pneumonia    Pneumonia.  Sepsis was present on arrival: leukocytosis, bandemia, tachypnea.  Likely gram positive CAP w/ strep pneumonia.  Blood cultures, throat culture, resp culture have been ordered.  Continue azithromycin, ceftriaxone.  Encourage pulmonary toilet    Acute hypoxic respiratory failure.  Likely due to above.  Does not require oxygen at home, currently requiring 4L.  Titrate as able for oxygen saturation >90%.  Encourage pulmonary toilet    Anxiety, depression.  Continue home regimen    Hypothyroidism.  TSH noted to be 0.04, with a free T4 of 1 in Feb 2025.  Continued

## 2025-02-21 NOTE — PROGRESS NOTES
A&Ox4. Ambulatory but with complaints of weakness. With intermittent shortness of breath. With congested cough, Tessalon pearls given as PRN. On oxygen at 4 lpm via nasal cannula.   SR x 2.   Call light within reach.   Bd on lowest position. Bed alarm on.

## 2025-02-21 NOTE — H&P
[facility] Medicine History & Physical    V 1.6    Date of Admission: 2/20/2025    Date of Service:  Pt seen/examined on 2/20/2025    [x]Admitted to Inpatient with expected LOS greater than two midnights due to medical therapy.  []Placed in Observation status.    Chief Admission Complaint: Cough, congestion    Presenting Admission History:      75 y.o. female with past medical history of anxiety, depression, hyperlipidemia, hypothyroidism presented to emergency department with chief complaint of cough, congestion.  Patient states that she has had symptoms for the last 2 weeks.  Her son and grandson were recently diagnosed with influenza A and she thought that the symptoms she was having was because of that.  After symptoms did not resolve after a week she was evaluated and was started on Augmentin and steroids for possible bronchitis.  Patient states that she continue to take these medications as directed but was not getting any better.  When patient was too weak to complete ADLs and did not have an appetite her son had her come into emergency department for evaluation.  Patient does state that she has had intermittent sweating but has not recorded a temperature.  Does endorse some headache and dizziness.  States that when she has a significant cough that there is chest pain.  Denies any GI,  symptoms.  Denies lower extremity edema.    Assessment/Plan:      Current Principal Problem:  Community acquired bacterial pneumonia    Community-acquired pneumonia  - Rocephin, azithromycin, Mucinex    Anxiety/depression  - Effexor, Wellbutrin, as needed trazodone nightly    Hyperlipidemia  - Rosuvastatin    Hypothyroidism  - Synthroid    Discussed management and the need for Hospitalization of the patient w/ the Emergency Department Provider: Chey    CXR: I have reviewed the CXR with the following interpretation:  EKG:  I have reviewed the EKG with the following interpretation: Sinus    Physical Exam Performed:      BP  136/64   Pulse 85   Temp 98 °F (36.7 °C) (Oral)   Resp 19   Ht 1.702 m (5' 7\")   Wt 101.3 kg (223 lb 4.8 oz)   SpO2 90%   BMI 34.97 kg/m²     General appearance: Ill-appearing  HEENT:  Pupils equal, round, and reactive to light. Conjunctivae/corneas clear.  Respiratory: Crackles bilaterally  Cardiovascular: Regular, no murmurs  Abdomen:  Soft, non-tender, non-distended with normal bowel sounds.  Musculoskeletal: No edema  Neurologic:  Neurovascularly intact without any focal sensory/motor deficits. Cranial nerves: II-XII intact, grossly non-focal.  Psychiatric:  Alert and oriented, thought content appropriate, normal insight  Skin:  Skin color, texture, turgor normal.  No rashes or lesions.  Capillary Refill:  Brisk,< 3 seconds   Peripheral Pulses:  +2 palpable, equal bilaterally     Diet: [x]Adult/General  []Cardiac  []Diabetic  []Low Fat  []NPO  []NPO after Midnight  []Other   DVT Prophylaxis: [x]PPx LMWH  []SQ Heparin  []IPC/SCDs  []Eliquis  []Xarelto  []Coumadin     Code status: [x]Full  []DNR/CCA  []Limited (DNR/CCA with Do Not Intubate)  []DNRCC  Surrogate Decision Maker:   Name Home Phone Work Phone Mobile Phone Relationship Lgl GrBERNA Cornell [phone]   Spouse    TRES JAMISON [phone]   Child         PT/OT Eval Status:   []NOT yet ordered  []Ordered and Pending   []Seen with Recommendations for:  []Home independently  []Home w/ assist  []HHC  []SNF  []Acute Rehab    Anticipated Discharge Day/Date: 3 days    Anticipated Discharge Location: [x]Home  []HHC  []SNF  []Acute Rehab  []ECF  []LTAC  []Hospice    Consults:      None    I personally have obtained, updated and/or reviewed the patient’s medication list on 2/20/2025   --------------------------------------------------------------------------------------------------------------------------------------------------------------------    Imaging:     XR CHEST PORTABLE    Result Date: 2/20/2025  EXAM: XR CHEST PORTABLE. DATE: 2/20/2025 18:05

## 2025-02-21 NOTE — PROGRESS NOTES
4 Eyes Skin Assessment     The patient is being assess for  Shift Handoff    I agree that 2 RN's have performed a thorough Head to Toe Skin Assessment on the patient. ALL assessment sites listed below have been assessed.       Areas assessed by both nurses: BELLA Grey/BELLA Arhcer  [x]   Head, Face, and Ears   [x]   Shoulders, Back, and Chest  [x]   Arms, Elbows, and Hands   [x]   Coccyx, Sacrum, and IschIum  [x]   Legs, Feet, and Heels        Does the Patient have Skin Breakdown?  No         Bret Prevention initiated:  No   Wound Care Orders initiated:  No      WOC nurse consulted for Pressure Injury (Stage 3,4, Unstageable, DTI, NWPT, and Complex wounds), New and Established Ostomies:  No      Nurse 1 eSignature: Electronically signed by Donald Vail RN on 2/21/25 at 7:50 AM EST    **SHARE this note so that the co-signing nurse is able to place an eSignature**    Nurse 2 eSignature: {Esignature:[id number]}

## 2025-02-21 NOTE — PROGRESS NOTES
Within functional limits  Strength: Within functional limits  Coordination: Within functional limits  Tone: Normal  Sensation: Intact    Bed Mobility Training  Bed Mobility Training: Yes  Overall Level of Assistance: Independent  Supine to Sit: Independent  Sit to Supine: Independent  Scooting: Independent  Balance  Sitting: Intact  Standing: Intact  Transfer Training  Transfer Training: Yes  Overall Level of Assistance: Independent  Sit to Stand: Independent  Stand to Sit: Independent  Toilet Transfer: Independent  Gait  Gait Training: Yes (20' + 20' + 10')  Overall Level of Assistance: Independent  Distance (ft): 50 Feet  Assistive Device: Gait belt  Interventions: Verbal cues  Step Length: Left shortened;Right shortened  Swing Pattern: Left symmetrical;Right symmetrical  Gait Abnormalities: Decreased step clearance    AM-PAC - Mobility    AM-PAC Basic Mobility - Inpatient   How much help is needed turning from your back to your side while in a flat bed without using bedrails?: None  How much help is needed moving from lying on your back to sitting on the side of a flat bed without using bedrails?: None  How much help is needed moving to and from a bed to a chair?: None  How much help is needed standing up from a chair using your arms?: None  How much help is needed walking in hospital room?: None  How much help is needed climbing 3-5 steps with a railing?: None  AM-PAC Inpatient Mobility Raw Score : 24  AM-PAC Inpatient T-Scale Score : 61.14  Mobility Inpatient CMS 0-100% Score: 0  Mobility Inpatient CMS G-Code Modifier : CH      Goals  Short Term Goals  Time Frame for Short Term Goals: 2/21/25 1 session only - 2/21 MET  Short Term Goal 1: Pt will be IND with bed mobility  Short Term Goal 2: Pt will perform all transfers IND  Short Term Goal 3: Pt will ambulate 50' IND  Patient Goals   Patient Goals : \"to feel better\"       Education  Patient Education  Education Given To: Patient  Education Provided: Role of  Therapy;Plan of Care;Transfer Training;Mobility Training  Education Method: Verbal  Barriers to Learning: None  Education Outcome: Verbalized understanding;Demonstrated understanding      Therapy Time   Individual Concurrent Group Co-treatment   Time In       0808   Time Out       0832   Minutes       24   Timed Code Treatment Minutes: 14 Minutes (10 min eval)       Osvaldo Stone, SPT

## 2025-02-21 NOTE — PROGRESS NOTES
Occupational Therapy  Facility/Department: [facility] C3 TELE/MED SURG/ONC  Occupational Therapy Initial Assessment/Treatment Note/Discharge Summary    Name: Muriel Thomas  : 1950  MRN: [id number]  Date of Service: 2025    Discharge Recommendations:  Home independently  OT Equipment Recommendations  Equipment Needed: No       Patient Diagnosis(es): The primary encounter diagnosis was Pneumonia due to infectious organism, unspecified laterality, unspecified part of lung. A diagnosis of Acute respiratory failure with hypoxia (HCC) was also pertinent to this visit.  Past Medical History:  has a past medical history of Acute MI (HCC), Hyperlipidemia, Hypothyroid, and Pre-diabetes.  Past Surgical History:  has a past surgical history that includes Breast surgery (); Ovarian cyst surgery (); Tonsillectomy (); Colonoscopy (2004); and Hysterectomy.       Assessment  Assessment: 76 y/o female presenting to [facility] with community acquired PNA. Pt resides with spouse in one-story home with 2 CARLA. Pr reports independent with ADLs/functional mobility without AD prior to admission. This date pt completed functional mobility/transfers without AD, footwear mgmt, and toileting IND. Pt on 4L O2 with NC off on arrival and pt in bathroom, O2 saturation dropping to 87% upon return to bed. Returned to O2 and recovered to 94% ~15 secs and maintained throughout remainder of session. Pt limited by endurance and respiratory status, but functioning close to baseline otherwise and anticipate full return to baseline as medical condition improves. Pt denies need for therapy at this time or any concerns returning home. OT rec home IND, no acute needs. OT signing off.   No Skilled OT: Independent with functional mobility;Independent with ADL's;Safe to return home  REQUIRES OT FOLLOW-UP: No  Activity Tolerance  Activity Tolerance: Patient Tolerated treatment well;Patient limited by fatigue     Plan  Occupational Therapy  for toileting (which includes using toilet, bedpan, or urinal)?: None  How much help is needed for putting on and taking off regular upper body clothing?: None  How much help is needed for taking care of personal grooming?: None  How much help for eating meals?: None  AM-[facility] Inpatient Daily Activity Raw Score: 24  AM-PAC Inpatient ADL T-Scale Score : 57.54  ADL Inpatient CMS 0-100% Score: 0  ADL Inpatient CMS G-Code Modifier : CH    Goals  Short Term Goals  Time Frame for Short Term Goals: ALL GOALS MET THIS DATE  Short Term Goal 1: pt will complete toileting IND  Short Term Goal 2: pt will complete footwear mgmt IND  Short Term Goal 3: pt will complete functional transfers IND  Short Term Goal 4: pt will complete household distance mobility IND  Patient Goals   Patient goals : \"to go home\"      Therapy Time   Individual Concurrent Group Co-treatment   Time In       0809   Time Out       0832   Minutes       23   Timed Code Treatment Minutes: 13 Minutes (10 min eval)       Mercy Berry OTS

## 2025-02-21 NOTE — CARE COORDINATION
Case Management Assessment  Initial Evaluation    Date/Time of Evaluation: 2/21/2025 11:01 AM  Assessment Completed by: Felicia Hayward RN    If patient is discharged prior to next notation, then this note serves as note for discharge by case management.    Patient Name: Muriel Thomas                   YOB: 1950  Diagnosis: Community acquired bacterial pneumonia [J15.9]  Acute respiratory failure with hypoxia (HCC) [J96.01]  Pneumonia due to infectious organism, unspecified laterality, unspecified part of lung [J18.9]                   Date / Time: 2/20/2025  5:56 PM    Patient Admission Status: Inpatient   Readmission Risk (Low < 19, Mod (19-27), High > 27): Readmission Risk Score: 9.8    Current PCP: McGilligan, Becky, MD  PCP verified by CM?  (has appt scheduled with new provider cant remember name.)    Chart Reviewed: Yes      History Provided by: Patient  Patient Orientation: Alert and Oriented, Person, Place, Situation, Self    Patient Cognition: Alert    Hospitalization in the last 30 days (Readmission):  No    If yes, Readmission Assessment in  Navigator will be completed.    Advance Directives:      Code Status: Full Code   Patient's Primary Decision Maker is: Legal Next of Kin    Primary Decision Maker: Manuel Thomas - [phone]    Discharge Planning:    Patient lives with: Spouse/Significant Other Type of Home: House  Primary Care Giver: Self  Patient Support Systems include: Spouse/Significant Other, Children   Current Financial resources: Medicare  Current community resources: None  Current services prior to admission: None            Current DME:              Type of Home Care services:  None    ADLS  Prior functional level: Independent in ADLs/IADLs  Current functional level: Independent in ADLs/IADLs    PT AM-PAC:   /24  OT AM-PAC: 24 /24    Family can provide assistance at DC: Yes  Would you like Case Management to discuss the discharge plan with any other family  associated with the providers was provided to:     Patient Representative Name:       The Patient and/or Patient Representative Agree with the Discharge Plan?      Felicia Hayward RN  Case Management Department

## 2025-02-21 NOTE — RT PROTOCOL NOTE
RT Inhaler-Nebulizer Bronchodilator Protocol Note    There is a bronchodilator order in the chart from a provider indicating to follow the RT Bronchodilator Protocol and there is an “Initiate RT Inhaler-Nebulizer Bronchodilator Protocol” order as well (see protocol at bottom of note).    CXR Findings:  XR CHEST PORTABLE    Result Date: 2/20/2025  Mild linear bibasilar opacity, favor atelectasis or scarring, however pneumonia is difficult to exclude. Probable small left pleural effusion. Electronically signed by Dominic Messina MD      The findings from the last RT Protocol Assessment were as follows:   History Pulmonary Disease: None or smoker <15 pack years  Respiratory Pattern: Regular pattern and RR 12-20 bpm  Breath Sounds: Slightly diminished and/or crackles  Cough: Strong, spontaneous, non-productive  Indication for Bronchodilator Therapy: None, Decreased or absent breath sounds  Bronchodilator Assessment Score: 2    Aerosolized bronchodilator medication orders have been revised according to the RT Inhaler-Nebulizer Bronchodilator Protocol below.    Respiratory Therapist to perform RT Therapy Protocol Assessment initially then follow the protocol.  Repeat RT Therapy Protocol Assessment PRN for score 0-3 or on second treatment, BID, and PRN for scores above 3.    No Indications - adjust the frequency to every 6 hours PRN wheezing or bronchospasm, if no treatments needed after 48 hours then discontinue using Per Protocol order mode.     If indication present, adjust the RT bronchodilator orders based on the Bronchodilator Assessment Score as indicated below.  Use Inhaler orders unless patient has one or more of the following: on home nebulizer, not able to hold breath for 10 seconds, is not alert and oriented, cannot activate and use MDI correctly, or respiratory rate 25 breaths per minute or more, then use the equivalent nebulizer order(s) with same Frequency and PRN reasons based on the score.  If a  patient is on this medication at home then do not decrease Frequency below that used at home.    0-3 - enter or revise RT bronchodilator order(s) to equivalent RT Bronchodilator order with Frequency of every 4 hours PRN for wheezing or increased work of breathing using Per Protocol order mode.        4-6 - enter or revise RT Bronchodilator order(s) to two equivalent RT bronchodilator orders with one order with BID Frequency and one order with Frequency of every 4 hours PRN wheezing or increased work of breathing using Per Protocol order mode.        7-10 - enter or revise RT Bronchodilator order(s) to two equivalent RT bronchodilator orders with one order with TID Frequency and one order with Frequency of every 4 hours PRN wheezing or increased work of breathing using Per Protocol order mode.       11-13 - enter or revise RT Bronchodilator order(s) to one equivalent RT bronchodilator order with QID Frequency and an Albuterol order with Frequency of every 4 hours PRN wheezing or increased work of breathing using Per Protocol order mode.      Greater than 13 - enter or revise RT Bronchodilator order(s) to one equivalent RT bronchodilator order with every 4 hours Frequency and an Albuterol order with Frequency of every 2 hours PRN wheezing or increased work of breathing using Per Protocol order mode.     RT to enter RT Home Evaluation for COPD & MDI Assessment order using Per Protocol order mode.    Electronically signed by Gayathri Beard RCP on 2/20/2025 at 9:42 PM

## 2025-02-21 NOTE — PLAN OF CARE
Problem: Safety - Adult  Goal: Free from fall injury  Outcome: Progressing  Flowsheets (Taken 2/21/2025 1040)  Free From Fall Injury:   Instruct family/caregiver on patient safety   Based on caregiver fall risk screen, instruct family/caregiver to ask for assistance with transferring infant if caregiver noted to have fall risk factors

## 2025-02-22 LAB
ALBUMIN SERPL-MCNC: 2.5 G/DL (ref 3.4–5)
ALBUMIN/GLOB SERPL: 0.8 {RATIO} (ref 1.1–2.2)
ALP SERPL-CCNC: 85 U/L (ref 40–129)
ALT SERPL-CCNC: 85 U/L (ref 10–40)
ANION GAP SERPL CALCULATED.3IONS-SCNC: 8 MMOL/L (ref 3–16)
ANISOCYTOSIS BLD QL SMEAR: ABNORMAL
AST SERPL-CCNC: 58 U/L (ref 15–37)
BACTERIA THROAT AEROBE CULT: NORMAL
BASOPHILS # BLD: 0 K/UL (ref 0–0.2)
BASOPHILS NFR BLD: 0 %
BILIRUB SERPL-MCNC: <0.2 MG/DL (ref 0–1)
BUN SERPL-MCNC: 11 MG/DL (ref 7–20)
CALCIUM SERPL-MCNC: 7.9 MG/DL (ref 8.3–10.6)
CHLORIDE SERPL-SCNC: 102 MMOL/L (ref 99–110)
CO2 SERPL-SCNC: 26 MMOL/L (ref 21–32)
CREAT SERPL-MCNC: 0.8 MG/DL (ref 0.6–1.2)
DEPRECATED RDW RBC AUTO: 13.2 % (ref 12.4–15.4)
EOSINOPHIL # BLD: 0.2 K/UL (ref 0–0.6)
EOSINOPHIL NFR BLD: 2 %
GFR SERPLBLD CREATININE-BSD FMLA CKD-EPI: 78 ML/MIN/{1.73_M2}
GLUCOSE SERPL-MCNC: 83 MG/DL (ref 70–99)
HCT VFR BLD AUTO: 33.1 % (ref 36–48)
HGB BLD-MCNC: 11.5 G/DL (ref 12–16)
LYMPHOCYTES # BLD: 2.3 K/UL (ref 1–5.1)
LYMPHOCYTES NFR BLD: 28 %
MACROCYTES BLD QL SMEAR: ABNORMAL
MAGNESIUM SERPL-MCNC: 2.1 MG/DL (ref 1.8–2.4)
MCH RBC QN AUTO: 33 PG (ref 26–34)
MCHC RBC AUTO-ENTMCNC: 34.7 G/DL (ref 31–36)
MCV RBC AUTO: 95.3 FL (ref 80–100)
MICROCYTES BLD QL SMEAR: ABNORMAL
MONOCYTES # BLD: 0.6 K/UL (ref 0–1.3)
MONOCYTES NFR BLD: 8 %
NEUTROPHILS # BLD: 4.9 K/UL (ref 1.7–7.7)
NEUTROPHILS NFR BLD: 59 %
NEUTS BAND NFR BLD MANUAL: 2 % (ref 0–7)
OVALOCYTES BLD QL SMEAR: ABNORMAL
PLATELET # BLD AUTO: 564 K/UL (ref 135–450)
PLATELET BLD QL SMEAR: ABNORMAL
PMV BLD AUTO: 6.7 FL (ref 5–10.5)
POIKILOCYTOSIS BLD QL SMEAR: ABNORMAL
POLYCHROMASIA BLD QL SMEAR: ABNORMAL
POTASSIUM SERPL-SCNC: 3.3 MMOL/L (ref 3.5–5.1)
PROT SERPL-MCNC: 5.5 G/DL (ref 6.4–8.2)
RBC # BLD AUTO: 3.47 M/UL (ref 4–5.2)
REPORT: NORMAL
SLIDE REVIEW: ABNORMAL
SODIUM SERPL-SCNC: 136 MMOL/L (ref 136–145)
VARIANT LYMPHS NFR BLD MANUAL: 1 % (ref 0–6)
WBC # BLD AUTO: 8.1 K/UL (ref 4–11)

## 2025-02-22 PROCEDURE — 85025 COMPLETE CBC W/AUTO DIFF WBC: CPT

## 2025-02-22 PROCEDURE — 6370000000 HC RX 637 (ALT 250 FOR IP): Performed by: STUDENT IN AN ORGANIZED HEALTH CARE EDUCATION/TRAINING PROGRAM

## 2025-02-22 PROCEDURE — 2580000003 HC RX 258

## 2025-02-22 PROCEDURE — 1200000000 HC SEMI PRIVATE

## 2025-02-22 PROCEDURE — 2500000003 HC RX 250 WO HCPCS: Performed by: STUDENT IN AN ORGANIZED HEALTH CARE EDUCATION/TRAINING PROGRAM

## 2025-02-22 PROCEDURE — 80053 COMPREHEN METABOLIC PANEL: CPT

## 2025-02-22 PROCEDURE — 2500000003 HC RX 250 WO HCPCS

## 2025-02-22 PROCEDURE — 36415 COLL VENOUS BLD VENIPUNCTURE: CPT

## 2025-02-22 PROCEDURE — 6360000002 HC RX W HCPCS: Performed by: STUDENT IN AN ORGANIZED HEALTH CARE EDUCATION/TRAINING PROGRAM

## 2025-02-22 PROCEDURE — 6360000002 HC RX W HCPCS

## 2025-02-22 PROCEDURE — 2700000000 HC OXYGEN THERAPY PER DAY

## 2025-02-22 PROCEDURE — 94640 AIRWAY INHALATION TREATMENT: CPT

## 2025-02-22 PROCEDURE — 94669 MECHANICAL CHEST WALL OSCILL: CPT

## 2025-02-22 PROCEDURE — 83735 ASSAY OF MAGNESIUM: CPT

## 2025-02-22 PROCEDURE — 94761 N-INVAS EAR/PLS OXIMETRY MLT: CPT

## 2025-02-22 PROCEDURE — 87040 BLOOD CULTURE FOR BACTERIA: CPT

## 2025-02-22 RX ORDER — POTASSIUM CHLORIDE 1500 MG/1
40 TABLET, EXTENDED RELEASE ORAL ONCE
Status: COMPLETED | OUTPATIENT
Start: 2025-02-22 | End: 2025-02-22

## 2025-02-22 RX ADMIN — ENOXAPARIN SODIUM 30 MG: 100 INJECTION SUBCUTANEOUS at 08:30

## 2025-02-22 RX ADMIN — VENLAFAXINE HYDROCHLORIDE 225 MG: 150 CAPSULE, EXTENDED RELEASE ORAL at 08:30

## 2025-02-22 RX ADMIN — TRAZODONE HYDROCHLORIDE 100 MG: 50 TABLET ORAL at 20:32

## 2025-02-22 RX ADMIN — AZITHROMYCIN MONOHYDRATE 500 MG: 500 INJECTION, POWDER, LYOPHILIZED, FOR SOLUTION INTRAVENOUS at 20:31

## 2025-02-22 RX ADMIN — ACETAMINOPHEN 650 MG: 325 TABLET ORAL at 18:44

## 2025-02-22 RX ADMIN — ENOXAPARIN SODIUM 30 MG: 100 INJECTION SUBCUTANEOUS at 20:33

## 2025-02-22 RX ADMIN — IPRATROPIUM BROMIDE AND ALBUTEROL SULFATE 1 DOSE: 2.5; .5 SOLUTION RESPIRATORY (INHALATION) at 16:24

## 2025-02-22 RX ADMIN — SODIUM CHLORIDE, PRESERVATIVE FREE 10 ML: 5 INJECTION INTRAVENOUS at 08:32

## 2025-02-22 RX ADMIN — IPRATROPIUM BROMIDE AND ALBUTEROL SULFATE 1 DOSE: 2.5; .5 SOLUTION RESPIRATORY (INHALATION) at 08:37

## 2025-02-22 RX ADMIN — SODIUM CHLORIDE, PRESERVATIVE FREE 10 ML: 5 INJECTION INTRAVENOUS at 20:32

## 2025-02-22 RX ADMIN — QUETIAPINE FUMARATE 300 MG: 100 TABLET ORAL at 20:32

## 2025-02-22 RX ADMIN — GUAIFENESIN 1200 MG: 600 TABLET ORAL at 08:29

## 2025-02-22 RX ADMIN — LEVOTHYROXINE SODIUM 112 MCG: 0.11 TABLET ORAL at 08:27

## 2025-02-22 RX ADMIN — IPRATROPIUM BROMIDE AND ALBUTEROL SULFATE 1 DOSE: 2.5; .5 SOLUTION RESPIRATORY (INHALATION) at 12:12

## 2025-02-22 RX ADMIN — IPRATROPIUM BROMIDE AND ALBUTEROL SULFATE 1 DOSE: 2.5; .5 SOLUTION RESPIRATORY (INHALATION) at 19:53

## 2025-02-22 RX ADMIN — POTASSIUM CHLORIDE 40 MEQ: 1500 TABLET, EXTENDED RELEASE ORAL at 12:05

## 2025-02-22 RX ADMIN — WATER 1000 MG: 1 INJECTION INTRAMUSCULAR; INTRAVENOUS; SUBCUTANEOUS at 20:31

## 2025-02-22 RX ADMIN — GUAIFENESIN 1200 MG: 600 TABLET ORAL at 20:32

## 2025-02-22 RX ADMIN — BUPROPION HYDROCHLORIDE 150 MG: 150 TABLET, FILM COATED, EXTENDED RELEASE ORAL at 08:28

## 2025-02-22 RX ADMIN — ROSUVASTATIN CALCIUM 20 MG: 10 TABLET, FILM COATED ORAL at 17:51

## 2025-02-22 ASSESSMENT — PAIN DESCRIPTION - DESCRIPTORS
DESCRIPTORS: ACHING
DESCRIPTORS: ACHING;DISCOMFORT

## 2025-02-22 ASSESSMENT — PAIN DESCRIPTION - LOCATION
LOCATION: HEAD
LOCATION: HEAD

## 2025-02-22 ASSESSMENT — PAIN SCALES - GENERAL
PAINLEVEL_OUTOF10: 2
PAINLEVEL_OUTOF10: 4
PAINLEVEL_OUTOF10: 6

## 2025-02-22 ASSESSMENT — PAIN SCALES - WONG BAKER: WONGBAKER_NUMERICALRESPONSE: NO HURT

## 2025-02-22 NOTE — PROGRESS NOTES
[facility] Medicine Progress Note  V 1.6      Date of Admission: 2/20/2025    Hospital Day: 3      Chief Admission Complaint:  cough, congestion    Subjective: Patient reports that she is feeling better however still coughing a lot and still requiring supplemental oxygen.  Denies using oxygen at home at all.      Presenting Admission History: \"75 y.o. female with past medical history of anxiety, depression, hyperlipidemia, hypothyroidism presented to emergency department with chief complaint of cough, congestion.  Patient states that she has had symptoms for the last 2 weeks.  Her son and grandson were recently diagnosed with influenza A and she thought that the symptoms she was having was because of that.  After symptoms did not resolve after a week she was evaluated and was started on Augmentin and steroids for possible bronchitis.  Patient states that she continue to take these medications as directed but was not getting any better.  When patient was too weak to complete ADLs and did not have an appetite her son had her come into emergency department for evaluation.  Patient does state that she has had intermittent sweating but has not recorded a temperature.  Does endorse some headache and dizziness.  States that when she has a significant cough that there is chest pain.  Denies any GI,  symptoms.  Denies lower extremity edema\"    Assessment/Plan:      Current Principal Problem:  Community acquired bacterial pneumonia    Pneumonia.  Sepsis was present on arrival: leukocytosis, bandemia, tachypnea.  Likely gram positive CAP w/ strep pneumonia.  Blood cultures, throat culture, resp culture have been ordered.  Continue azithromycin, ceftriaxone.  Encourage pulmonary toilet  Acute hypoxic respiratory failure.  Likely due to above.  Does not require oxygen at home, currently requiring 4L.  Titrate as able for oxygen saturation >90%.  Encourage pulmonary toilet    -Continue antibiotic  -If patient respiratory status  acetaminophen      Physical Exam Performed:      General appearance:  No apparent distress  Respiratory:  Normal respiratory effort.  4L per NC.  Bibasilar crackles; diminished overall  Cardiovascular:  Regular rate and rhythm.  Abdomen:  Soft, non-tender, non-distended.  Musculoskeletal:  No edema  Neurologic:  Non-focal  Psychiatric:  Alert and oriented    /60   Pulse 86   Temp 98.2 °F (36.8 °C) (Oral)   Resp 20   Ht 1.702 m (5' 7\")   Wt 101.3 kg (223 lb 4.8 oz)   SpO2 94%   BMI 34.97 kg/m²     Telemetry:      Personally reviewed and interpreted telemetry (Rhythm Strip) on 2/22/2025 with the following findings: Not on telemetry    Diet: ADULT DIET; Regular    DVT Prophylaxis: [x]PPx LMWH  []SQ Heparin  []IPC/SCDs  []Eliquis  []Xarelto  []Coumadin  [] Heparin Drip  []Other -      Code status: Full Code    PT/OT Eval Status:   []NOT yet ordered  []Ordered and Pending   [x]Seen with Recommendations for:   [x]Home independently  []Home w/ assist  []HHC  []SNF  []Acute Rehab    Multi-Disciplinary Rounds with Case Management completed on 2/22/2025 with the following recommendations:  Anticipated Discharge Location: [x]Home w/ []HHC vs []SNF  []Acute Rehab  []LTAC  []Hospice  []Other -    Anticipated Discharge Day/Date: Likely tomorrow or today after  Barriers to Discharge: No significant improvement in respiratory status  --------------------------------------------------    MDM (any 2 required for High level billing)    A. Problems (any 1)  [x] Acute/Chronic Illness/injury posing ongoing threat to life and/or bodily function without ongoing treatment    [] Severe exacerbation of chronic illness    --------------------------------------------------  B. Risk of Treatment (any 1)    [] Drugs/treatments that require intensive monitoring for toxicity    [] IV ABX (Vancomycin, Aminoglycosides, etc)     [] Post-Cath/Contrast study requiring serial monitoring    [] IV Narcotic analgesia    [] Aggressive IV

## 2025-02-22 NOTE — PROGRESS NOTES
Pt assessment completed and charted. VSS. Pt a/ox4. Pt denies pain. Pt ambulates in room w/o assistance. Pt was on RA for sometimes over night per report, but pt woke up this morning w/ SOB. Pt dipping into the upper 80's. Pt placed on 3L NC and returned to 92%. Pt reported SOB improved. Pt educated on IS and how to use. Pt denies any other needs at this time.  Pt calls out appropriately.       Pt is a fall risk;  -Bed in lowest position and wheels locked.  -Call light within reach.   -Bedside table within reach.   -Non-skid footwear in place.

## 2025-02-22 NOTE — PROGRESS NOTES
Report received from day shift RN. Patient resting comfortably in bed. No signs of discomfort or distress. Bed is in lowest position, wheels locked, 2/2 side rails up. Bedside table and call light within reach. White board updated. Will continue to monitor patient. Mercy Penaloza RN    8:37 PM  Shift assessment complete. (See findings in flowsheet). Med pass complete. (See MAR). VSS. Patient with no complaints at this time. Mercy Penaloza RN

## 2025-02-22 NOTE — PLAN OF CARE
Problem: Safety - Adult  Goal: Free from fall injury  Outcome: Progressing  Flowsheets (Taken 2/22/2025 1140)  Free From Fall Injury:   Instruct family/caregiver on patient safety   Based on caregiver fall risk screen, instruct family/caregiver to ask for assistance with transferring infant if caregiver noted to have fall risk factors

## 2025-02-23 ENCOUNTER — APPOINTMENT (OUTPATIENT)
Dept: CT IMAGING | Age: 75
DRG: 871 | End: 2025-02-23
Payer: MEDICARE

## 2025-02-23 LAB
ALBUMIN SERPL-MCNC: 2.8 G/DL (ref 3.4–5)
ALBUMIN/GLOB SERPL: 0.8 {RATIO} (ref 1.1–2.2)
ALP SERPL-CCNC: 87 U/L (ref 40–129)
ALT SERPL-CCNC: 68 U/L (ref 10–40)
ANION GAP SERPL CALCULATED.3IONS-SCNC: 9 MMOL/L (ref 3–16)
ANISOCYTOSIS BLD QL SMEAR: ABNORMAL
AST SERPL-CCNC: 36 U/L (ref 15–37)
BASOPHILS # BLD: 0 K/UL (ref 0–0.2)
BASOPHILS NFR BLD: 0 %
BILIRUB SERPL-MCNC: <0.2 MG/DL (ref 0–1)
BUN SERPL-MCNC: 8 MG/DL (ref 7–20)
CALCIUM SERPL-MCNC: 8.5 MG/DL (ref 8.3–10.6)
CHLORIDE SERPL-SCNC: 101 MMOL/L (ref 99–110)
CO2 SERPL-SCNC: 27 MMOL/L (ref 21–32)
CREAT SERPL-MCNC: 0.8 MG/DL (ref 0.6–1.2)
DEPRECATED RDW RBC AUTO: 13.4 % (ref 12.4–15.4)
EOSINOPHIL # BLD: 0.1 K/UL (ref 0–0.6)
EOSINOPHIL NFR BLD: 1 %
GFR SERPLBLD CREATININE-BSD FMLA CKD-EPI: 78 ML/MIN/{1.73_M2}
GLUCOSE SERPL-MCNC: 82 MG/DL (ref 70–99)
HCT VFR BLD AUTO: 35.4 % (ref 36–48)
HGB BLD-MCNC: 12 G/DL (ref 12–16)
LYMPHOCYTES # BLD: 2.5 K/UL (ref 1–5.1)
LYMPHOCYTES NFR BLD: 25 %
MACROCYTES BLD QL SMEAR: ABNORMAL
MCH RBC QN AUTO: 32.8 PG (ref 26–34)
MCHC RBC AUTO-ENTMCNC: 34 G/DL (ref 31–36)
MCV RBC AUTO: 96.7 FL (ref 80–100)
METAMYELOCYTES NFR BLD MANUAL: 1 %
MONOCYTES # BLD: 0.5 K/UL (ref 0–1.3)
MONOCYTES NFR BLD: 7 %
MYELOCYTES NFR BLD MANUAL: 2 %
NEUTROPHILS # BLD: 4.4 K/UL (ref 1.7–7.7)
NEUTROPHILS NFR BLD: 34 %
NEUTS BAND NFR BLD MANUAL: 22 % (ref 0–7)
PLATELET # BLD AUTO: 639 K/UL (ref 135–450)
PMV BLD AUTO: 6.4 FL (ref 5–10.5)
POIKILOCYTOSIS BLD QL SMEAR: ABNORMAL
POTASSIUM SERPL-SCNC: 4.1 MMOL/L (ref 3.5–5.1)
PROT SERPL-MCNC: 6.1 G/DL (ref 6.4–8.2)
RBC # BLD AUTO: 3.66 M/UL (ref 4–5.2)
SODIUM SERPL-SCNC: 137 MMOL/L (ref 136–145)
SPHEROCYTES BLD QL SMEAR: ABNORMAL
TOXIC GRANULES BLD QL SMEAR: PRESENT
VARIANT LYMPHS NFR BLD MANUAL: 8 % (ref 0–6)
WBC # BLD AUTO: 7.5 K/UL (ref 4–11)

## 2025-02-23 PROCEDURE — 94640 AIRWAY INHALATION TREATMENT: CPT

## 2025-02-23 PROCEDURE — 94669 MECHANICAL CHEST WALL OSCILL: CPT

## 2025-02-23 PROCEDURE — 6360000004 HC RX CONTRAST MEDICATION: Performed by: STUDENT IN AN ORGANIZED HEALTH CARE EDUCATION/TRAINING PROGRAM

## 2025-02-23 PROCEDURE — 2700000000 HC OXYGEN THERAPY PER DAY

## 2025-02-23 PROCEDURE — 71260 CT THORAX DX C+: CPT

## 2025-02-23 PROCEDURE — 2580000003 HC RX 258: Performed by: STUDENT IN AN ORGANIZED HEALTH CARE EDUCATION/TRAINING PROGRAM

## 2025-02-23 PROCEDURE — 6370000000 HC RX 637 (ALT 250 FOR IP): Performed by: STUDENT IN AN ORGANIZED HEALTH CARE EDUCATION/TRAINING PROGRAM

## 2025-02-23 PROCEDURE — 94761 N-INVAS EAR/PLS OXIMETRY MLT: CPT

## 2025-02-23 PROCEDURE — 2500000003 HC RX 250 WO HCPCS: Performed by: STUDENT IN AN ORGANIZED HEALTH CARE EDUCATION/TRAINING PROGRAM

## 2025-02-23 PROCEDURE — 6360000002 HC RX W HCPCS: Performed by: STUDENT IN AN ORGANIZED HEALTH CARE EDUCATION/TRAINING PROGRAM

## 2025-02-23 PROCEDURE — 2580000003 HC RX 258

## 2025-02-23 PROCEDURE — 36415 COLL VENOUS BLD VENIPUNCTURE: CPT

## 2025-02-23 PROCEDURE — 80053 COMPREHEN METABOLIC PANEL: CPT

## 2025-02-23 PROCEDURE — 85025 COMPLETE CBC W/AUTO DIFF WBC: CPT

## 2025-02-23 PROCEDURE — 6360000002 HC RX W HCPCS

## 2025-02-23 PROCEDURE — 2500000003 HC RX 250 WO HCPCS

## 2025-02-23 PROCEDURE — 1200000000 HC SEMI PRIVATE

## 2025-02-23 RX ORDER — FUROSEMIDE 10 MG/ML
40 INJECTION INTRAMUSCULAR; INTRAVENOUS ONCE
Status: COMPLETED | OUTPATIENT
Start: 2025-02-23 | End: 2025-02-23

## 2025-02-23 RX ORDER — IOPAMIDOL 755 MG/ML
75 INJECTION, SOLUTION INTRAVASCULAR
Status: COMPLETED | OUTPATIENT
Start: 2025-02-23 | End: 2025-02-23

## 2025-02-23 RX ORDER — SODIUM CHLORIDE FOR INHALATION 3 %
4 VIAL, NEBULIZER (ML) INHALATION 2 TIMES DAILY
Status: DISCONTINUED | OUTPATIENT
Start: 2025-02-23 | End: 2025-02-24

## 2025-02-23 RX ADMIN — QUETIAPINE FUMARATE 300 MG: 100 TABLET ORAL at 20:25

## 2025-02-23 RX ADMIN — IOPAMIDOL 75 ML: 755 INJECTION, SOLUTION INTRAVENOUS at 11:04

## 2025-02-23 RX ADMIN — ACETAMINOPHEN 650 MG: 325 TABLET ORAL at 13:49

## 2025-02-23 RX ADMIN — IPRATROPIUM BROMIDE AND ALBUTEROL SULFATE 1 DOSE: 2.5; .5 SOLUTION RESPIRATORY (INHALATION) at 20:11

## 2025-02-23 RX ADMIN — FUROSEMIDE 40 MG: 10 INJECTION, SOLUTION INTRAMUSCULAR; INTRAVENOUS at 18:24

## 2025-02-23 RX ADMIN — ENOXAPARIN SODIUM 30 MG: 100 INJECTION SUBCUTANEOUS at 08:49

## 2025-02-23 RX ADMIN — TRAZODONE HYDROCHLORIDE 100 MG: 50 TABLET ORAL at 20:26

## 2025-02-23 RX ADMIN — GUAIFENESIN 1200 MG: 600 TABLET ORAL at 20:25

## 2025-02-23 RX ADMIN — ROSUVASTATIN CALCIUM 20 MG: 10 TABLET, FILM COATED ORAL at 17:36

## 2025-02-23 RX ADMIN — VENLAFAXINE HYDROCHLORIDE 225 MG: 150 CAPSULE, EXTENDED RELEASE ORAL at 08:48

## 2025-02-23 RX ADMIN — WATER 1000 MG: 1 INJECTION INTRAMUSCULAR; INTRAVENOUS; SUBCUTANEOUS at 20:25

## 2025-02-23 RX ADMIN — SODIUM CHLORIDE, PRESERVATIVE FREE 10 ML: 5 INJECTION INTRAVENOUS at 08:49

## 2025-02-23 RX ADMIN — AZITHROMYCIN MONOHYDRATE 500 MG: 500 INJECTION, POWDER, LYOPHILIZED, FOR SOLUTION INTRAVENOUS at 20:28

## 2025-02-23 RX ADMIN — SODIUM CHLORIDE, PRESERVATIVE FREE 10 ML: 5 INJECTION INTRAVENOUS at 20:26

## 2025-02-23 RX ADMIN — IPRATROPIUM BROMIDE AND ALBUTEROL SULFATE 1 DOSE: 2.5; .5 SOLUTION RESPIRATORY (INHALATION) at 11:20

## 2025-02-23 RX ADMIN — BUPROPION HYDROCHLORIDE 150 MG: 150 TABLET, FILM COATED, EXTENDED RELEASE ORAL at 08:49

## 2025-02-23 RX ADMIN — GUAIFENESIN 1200 MG: 600 TABLET ORAL at 08:48

## 2025-02-23 RX ADMIN — ENOXAPARIN SODIUM 30 MG: 100 INJECTION SUBCUTANEOUS at 20:26

## 2025-02-23 RX ADMIN — LEVOTHYROXINE SODIUM 112 MCG: 0.11 TABLET ORAL at 06:15

## 2025-02-23 RX ADMIN — SODIUM CHLORIDE SOLN NEBU 3% 4 ML: 3 NEBU SOLN at 16:38

## 2025-02-23 RX ADMIN — IPRATROPIUM BROMIDE AND ALBUTEROL SULFATE 1 DOSE: 2.5; .5 SOLUTION RESPIRATORY (INHALATION) at 07:50

## 2025-02-23 RX ADMIN — IPRATROPIUM BROMIDE AND ALBUTEROL SULFATE 1 DOSE: 2.5; .5 SOLUTION RESPIRATORY (INHALATION) at 16:38

## 2025-02-23 ASSESSMENT — PAIN DESCRIPTION - DESCRIPTORS: DESCRIPTORS: ACHING

## 2025-02-23 ASSESSMENT — PAIN DESCRIPTION - LOCATION: LOCATION: HEAD

## 2025-02-23 ASSESSMENT — PAIN SCALES - GENERAL: PAINLEVEL_OUTOF10: 4

## 2025-02-23 NOTE — PLAN OF CARE
Problem: Safety - Adult  Goal: Free from fall injury  2/23/2025 0901 by Noris Gan RN  Outcome: Progressing  Flowsheets (Taken 2/23/2025 0901)  Free From Fall Injury:   Instruct family/caregiver on patient safety   Based on caregiver fall risk screen, instruct family/caregiver to ask for assistance with transferring infant if caregiver noted to have fall risk factors     Problem: Pain  Goal: Verbalizes/displays adequate comfort level or baseline comfort level  2/23/2025 0901 by Noris Gan RN  Outcome: Progressing  Flowsheets (Taken 2/23/2025 0901)  Verbalizes/displays adequate comfort level or baseline comfort level:   Encourage patient to monitor pain and request assistance   Assess pain using appropriate pain scale   Administer analgesics based on type and severity of pain and evaluate response     Problem: Respiratory - Adult  Goal: Achieves optimal ventilation and oxygenation  2/23/2025 0901 by Noris Gan RN  Outcome: Not Progressing  Flowsheets (Taken 2/23/2025 0901)  Achieves optimal ventilation and oxygenation:   Assess for changes in respiratory status   Oxygen supplementation based on oxygen saturation or arterial blood gases     Problem: Infection - Adult  Goal: Absence of infection at discharge  2/23/2025 0901 by Noris Gan RN  Outcome: Progressing  Flowsheets (Taken 2/23/2025 0901)  Absence of infection at discharge:   Assess and monitor for signs and symptoms of infection   Monitor lab/diagnostic results

## 2025-02-23 NOTE — PROGRESS NOTES
Pt assessment completed and charted. VSS. Pt a/ox4. Pt denies pain. Pt ambulates in room w/o assistance. Pt was on 2L NC this morning, but at morning vitals Pt's o2 was dropping innto upper 80's, pt increased to 4LNC, now stating 92%, will titrate as pt tolerates. Pt reports that when ambulating she does get very SOB.  Pt educated on IS, pt reports using overnight, but feels she can't use today. Pt denies any other needs at this time.  Pt calls out appropriately.         Pt is a fall risk;  -Bed in lowest position and wheels locked.  -Call light within reach.   -Bedside table within reach.   -Non-skid footwear in place.

## 2025-02-23 NOTE — PLAN OF CARE
Problem: Safety - Adult  Goal: Free from fall injury  2/22/2025 2313 by Sonu Justice RN  Outcome: Progressing  Flowsheets (Taken 2/22/2025 2313)  Free From Fall Injury:   Instruct family/caregiver on patient safety   Based on caregiver fall risk screen, instruct family/caregiver to ask for assistance with transferring infant if caregiver noted to have fall risk factors     Problem: Pain  Goal: Verbalizes/displays adequate comfort level or baseline comfort level  Outcome: Progressing  Flowsheets (Taken 2/22/2025 2313)  Verbalizes/displays adequate comfort level or baseline comfort level:   Encourage patient to monitor pain and request assistance   Assess pain using appropriate pain scale   Administer analgesics based on type and severity of pain and evaluate response   Implement non-pharmacological measures as appropriate and evaluate response     Problem: Respiratory - Adult  Goal: Achieves optimal ventilation and oxygenation  Outcome: Progressing  Flowsheets (Taken 2/22/2025 2313)  Achieves optimal ventilation and oxygenation:   Assess for changes in respiratory status   Assess for changes in mentation and behavior   Position to facilitate oxygenation and minimize respiratory effort   Oxygen supplementation based on oxygen saturation or arterial blood gases   Encourage broncho-pulmonary hygiene including cough, deep breathe, incentive spirometry   Assess the need for suctioning and aspirate as needed   Assess and instruct to report shortness of breath or any respiratory difficulty     Problem: Infection - Adult  Goal: Absence of infection at discharge  Outcome: Progressing  Flowsheets (Taken 2/22/2025 2313)  Absence of infection at discharge:   Assess and monitor for signs and symptoms of infection   Monitor lab/diagnostic results   Monitor all insertion sites i.e., indwelling lines, tubes and drains   [place] appropriate cooling/warming therapies per order   Instruct and encourage patient and family to use

## 2025-02-23 NOTE — PROGRESS NOTES
[facility] Medicine Progress Note  V 1.6      Date of Admission: 2/20/2025    Hospital Day: 4      Chief Admission Complaint:  cough, congestion    Subjective: Patient still requiring 4 L nasal cannula oxygen at rest.  Denies any improvement in her respiratory status.    Presenting Admission History: \"75 y.o. female with past medical history of anxiety, depression, hyperlipidemia, hypothyroidism presented to emergency department with chief complaint of cough, congestion.  Patient states that she has had symptoms for the last 2 weeks.  Her son and grandson were recently diagnosed with influenza A and she thought that the symptoms she was having was because of that.  After symptoms did not resolve after a week she was evaluated and was started on Augmentin and steroids for possible bronchitis.  Patient states that she continue to take these medications as directed but was not getting any better.  When patient was too weak to complete ADLs and did not have an appetite her son had her come into emergency department for evaluation.  Patient does state that she has had intermittent sweating but has not recorded a temperature.  Does endorse some headache and dizziness.  States that when she has a significant cough that there is chest pain.  Denies any GI,  symptoms.  Denies lower extremity edema\"    Assessment/Plan:      Current Principal Problem:  Community acquired bacterial pneumonia    Pneumonia.  Sepsis was present on arrival: leukocytosis, bandemia, tachypnea.  Likely gram positive CAP w/ strep pneumonia.  Blood cultures, throat culture, resp culture have been ordered.  Continue azithromycin, ceftriaxone.  Encourage pulmonary toilet  Acute hypoxic respiratory failure.  Likely due to above.  Does not require oxygen at home, currently requiring 4L.  Titrate as able for oxygen saturation >90%.  Encourage pulmonary toilet    -Continue antibiotic  -Given no improvement in respiratory status I obtained CT chest with  contrast.  Per my review no large PEs however does show significant consolidation in the right middle lobe and the posterior aspect.  Will await official read.  And will consider pulmonology consult for bronchoscopy consideration given no improvement in respiratory status for therapeutic and diagnostic purposes  -Does not appear volume overloaded.  No lower extremity edema therefore I will not try diuresis    1 out of 2 positive blood culture    -Currently shows coag negative staph.  -Given it is only 1 out of 2.  Patient has been afebrile, and leukocytosis is improved I do not think this is a true infection and is most likely contaminant.  Will repeat a fresh set of blood cultures.  If comes back positive again we will do more extensive imaging specifically will include imaging of the pelvis as patient has had recent surgery in the area.  -Repeat cultures have remained negative    HypoKalemia - etiology clinically unable to determine.  Followed serial Potassium, personally reviewed and documented in this note. Replaced PRN.          Anxiety, depression.  Continue home regimen    Hypothyroidism.  TSH noted to be 0.04, with a free T4 of 1 in Feb 2025.  Continued home dose levothyroxine    Hyperlipidemia, controlled.  LDL noted to be 97 in Feb 2025.  Continue statin     Ongoing threat to life and/or bodily function without ongoing treatment due to:  hypoxic resp failure    Consults:      None      --------------------------------------------------    Medications:        Infusion Medications    sodium chloride       Scheduled Medications    buPROPion  150 mg Oral Daily    levothyroxine  112 mcg Oral Daily    QUEtiapine  300 mg Oral Daily    rosuvastatin  20 mg Oral QPM    venlafaxine  225 mg Oral Daily    azithromycin  500 mg IntraVENous Daily    cefTRIAXone (ROCEPHIN) IV  1,000 mg IntraVENous Daily    guaiFENesin  1,200 mg Oral BID    ipratropium 0.5 mg-albuterol 2.5 mg  1 Dose Inhalation Q4H WA RT    sodium chloride

## 2025-02-23 NOTE — PROGRESS NOTES
Assessment completed and documented. VSS. A/ox4.   On 2.5L oxygen via NC - no signs of SOB but reports having dyspnea on exertion.  Encouraged to do deep breathing/coughing exercises and use of IS.   Patent PIV line on her L forearm with intermittent IV antibiotic infusion.   Bed locked and in lowest position.   Bedside table and call light within reach. Denies further needs at this time.

## 2025-02-24 ENCOUNTER — APPOINTMENT (OUTPATIENT)
Age: 75
DRG: 871 | End: 2025-02-24
Attending: STUDENT IN AN ORGANIZED HEALTH CARE EDUCATION/TRAINING PROGRAM
Payer: MEDICARE

## 2025-02-24 LAB
BACTERIA BLD CULT ORG #2: ABNORMAL
BACTERIA BLD CULT ORG #2: ABNORMAL
BACTERIA BLD CULT: NORMAL
ECHO AO ASC DIAM: 3.6 CM
ECHO AO ASCENDING AORTA INDEX: 1.7 CM/M2
ECHO AO ROOT DIAM: 3 CM
ECHO AO ROOT INDEX: 1.42 CM/M2
ECHO AR MAX VEL PISA: 3.5 M/S
ECHO AV AREA PEAK VELOCITY: 1.8 CM2
ECHO AV AREA VTI: 1.9 CM2
ECHO AV AREA/BSA PEAK VELOCITY: 0.8 CM2/M2
ECHO AV AREA/BSA VTI: 0.9 CM2/M2
ECHO AV MEAN GRADIENT: 4 MMHG
ECHO AV MEAN VELOCITY: 1 M/S
ECHO AV PEAK GRADIENT: 8 MMHG
ECHO AV PEAK VELOCITY: 1.4 M/S
ECHO AV REGURGITANT PHT: 500 MS
ECHO AV VELOCITY RATIO: 0.71
ECHO AV VTI: 24.8 CM
ECHO BSA: 2.19 M2
ECHO LA AREA 2C: 12.4 CM2
ECHO LA AREA 4C: 10.1 CM2
ECHO LA DIAMETER INDEX: 1.27 CM/M2
ECHO LA DIAMETER: 2.7 CM
ECHO LA MAJOR AXIS: 4.3 CM
ECHO LA MINOR AXIS: 4.9 CM
ECHO LA TO AORTIC ROOT RATIO: 0.9
ECHO LA VOL BP: 24 ML (ref 22–52)
ECHO LA VOL MOD A2C: 27 ML (ref 22–52)
ECHO LA VOL MOD A4C: 19 ML (ref 22–52)
ECHO LA VOL/BSA BIPLANE: 11 ML/M2 (ref 16–34)
ECHO LA VOLUME INDEX MOD A2C: 13 ML/M2 (ref 16–34)
ECHO LA VOLUME INDEX MOD A4C: 9 ML/M2 (ref 16–34)
ECHO LV E' LATERAL VELOCITY: 7.18 CM/S
ECHO LV E' SEPTAL VELOCITY: 6.53 CM/S
ECHO LV EDV 3D: 86 ML
ECHO LV EDV INDEX 3D: 41 ML/M2
ECHO LV EF PHYSICIAN: 55 %
ECHO LV EJECTION FRACTION 3D: 58 %
ECHO LV ESV 3D: 36 ML
ECHO LV ESV INDEX 3D: 17 ML/M2
ECHO LV FRACTIONAL SHORTENING: 31 % (ref 28–44)
ECHO LV GLOBAL LONGITUDINAL STRAIN (GLS): -14.6 %
ECHO LV GLOBAL LONGITUDINAL STRAIN (GLS): -6 %
ECHO LV GLOBAL LONGITUDINAL STRAIN (GLS): -7.3 %
ECHO LV GLOBAL LONGITUDINAL STRAIN (GLS): -9.3 %
ECHO LV INTERNAL DIMENSION DIASTOLE INDEX: 1.98 CM/M2
ECHO LV INTERNAL DIMENSION DIASTOLIC: 4.2 CM (ref 3.9–5.3)
ECHO LV INTERNAL DIMENSION SYSTOLIC INDEX: 1.37 CM/M2
ECHO LV INTERNAL DIMENSION SYSTOLIC: 2.9 CM
ECHO LV IVSD: 0.7 CM (ref 0.6–0.9)
ECHO LV MASS 2D: 93 G (ref 67–162)
ECHO LV MASS 3D INDEX: 56.6 G/M2
ECHO LV MASS 3D: 120 G
ECHO LV MASS INDEX 2D: 43.9 G/M2 (ref 43–95)
ECHO LV POSTERIOR WALL DIASTOLIC: 0.8 CM (ref 0.6–0.9)
ECHO LV RELATIVE WALL THICKNESS RATIO: 0.38
ECHO LVOT AREA: 2.5 CM2
ECHO LVOT AV VTI INDEX: 0.73
ECHO LVOT DIAM: 1.8 CM
ECHO LVOT MEAN GRADIENT: 2 MMHG
ECHO LVOT PEAK GRADIENT: 4 MMHG
ECHO LVOT PEAK VELOCITY: 1 M/S
ECHO LVOT STROKE VOLUME INDEX: 21.7 ML/M2
ECHO LVOT SV: 46 ML
ECHO LVOT VTI: 18.1 CM
ECHO MV A VELOCITY: 0.98 M/S
ECHO MV E DECELERATION TIME (DT): 214 MS
ECHO MV E VELOCITY: 0.6 M/S
ECHO MV E/A RATIO: 0.61
ECHO MV E/E' LATERAL: 8.36
ECHO MV E/E' RATIO (AVERAGED): 8.77
ECHO MV E/E' SEPTAL: 9.19
ECHO PV MAX VELOCITY: 0.7 M/S
ECHO PV PEAK GRADIENT: 2 MMHG
ECHO RA AREA 4C: 9.1 CM2
ECHO RA END SYSTOLIC VOLUME APICAL 4 CHAMBER INDEX BSA: 7 ML/M2
ECHO RA VOLUME: 14 ML
ECHO RV BASAL DIMENSION: 2.4 CM
ECHO RV FREE WALL PEAK S': 14.3 CM/S
ECHO RV LONGITUDINAL DIMENSION: 7.5 CM
ECHO RV MID DIMENSION: 1.6 CM
ECHO RV TAPSE: 2.2 CM (ref 1.7–?)
NT-PROBNP SERPL-MCNC: 71 PG/ML (ref 0–124)
ORGANISM: ABNORMAL
ORGANISM: ABNORMAL

## 2025-02-24 PROCEDURE — 2500000003 HC RX 250 WO HCPCS

## 2025-02-24 PROCEDURE — 76376 3D RENDER W/INTRP POSTPROCES: CPT | Performed by: STUDENT IN AN ORGANIZED HEALTH CARE EDUCATION/TRAINING PROGRAM

## 2025-02-24 PROCEDURE — 6360000002 HC RX W HCPCS: Performed by: INTERNAL MEDICINE

## 2025-02-24 PROCEDURE — 94669 MECHANICAL CHEST WALL OSCILL: CPT

## 2025-02-24 PROCEDURE — 83880 ASSAY OF NATRIURETIC PEPTIDE: CPT

## 2025-02-24 PROCEDURE — 76376 3D RENDER W/INTRP POSTPROCES: CPT

## 2025-02-24 PROCEDURE — 2580000003 HC RX 258: Performed by: STUDENT IN AN ORGANIZED HEALTH CARE EDUCATION/TRAINING PROGRAM

## 2025-02-24 PROCEDURE — 93306 TTE W/DOPPLER COMPLETE: CPT | Performed by: STUDENT IN AN ORGANIZED HEALTH CARE EDUCATION/TRAINING PROGRAM

## 2025-02-24 PROCEDURE — 93356 MYOCRD STRAIN IMG SPCKL TRCK: CPT | Performed by: STUDENT IN AN ORGANIZED HEALTH CARE EDUCATION/TRAINING PROGRAM

## 2025-02-24 PROCEDURE — 94640 AIRWAY INHALATION TREATMENT: CPT

## 2025-02-24 PROCEDURE — 1200000000 HC SEMI PRIVATE

## 2025-02-24 PROCEDURE — 94761 N-INVAS EAR/PLS OXIMETRY MLT: CPT

## 2025-02-24 PROCEDURE — 6370000000 HC RX 637 (ALT 250 FOR IP): Performed by: STUDENT IN AN ORGANIZED HEALTH CARE EDUCATION/TRAINING PROGRAM

## 2025-02-24 PROCEDURE — 6360000002 HC RX W HCPCS: Performed by: STUDENT IN AN ORGANIZED HEALTH CARE EDUCATION/TRAINING PROGRAM

## 2025-02-24 PROCEDURE — 36415 COLL VENOUS BLD VENIPUNCTURE: CPT

## 2025-02-24 PROCEDURE — 2700000000 HC OXYGEN THERAPY PER DAY

## 2025-02-24 PROCEDURE — 6360000002 HC RX W HCPCS

## 2025-02-24 PROCEDURE — 2500000003 HC RX 250 WO HCPCS: Performed by: STUDENT IN AN ORGANIZED HEALTH CARE EDUCATION/TRAINING PROGRAM

## 2025-02-24 RX ORDER — GUAIFENESIN/DEXTROMETHORPHAN 100-10MG/5
5 SYRUP ORAL EVERY 4 HOURS PRN
Status: DISCONTINUED | OUTPATIENT
Start: 2025-02-24 | End: 2025-03-02 | Stop reason: HOSPADM

## 2025-02-24 RX ORDER — ACETYLCYSTEINE 200 MG/ML
600 SOLUTION ORAL; RESPIRATORY (INHALATION)
Status: DISCONTINUED | OUTPATIENT
Start: 2025-02-24 | End: 2025-02-25

## 2025-02-24 RX ADMIN — IPRATROPIUM BROMIDE AND ALBUTEROL SULFATE 1 DOSE: 2.5; .5 SOLUTION RESPIRATORY (INHALATION) at 08:32

## 2025-02-24 RX ADMIN — ACETYLCYSTEINE 600 MG: 200 INHALANT RESPIRATORY (INHALATION) at 19:28

## 2025-02-24 RX ADMIN — IPRATROPIUM BROMIDE AND ALBUTEROL SULFATE 1 DOSE: 2.5; .5 SOLUTION RESPIRATORY (INHALATION) at 16:37

## 2025-02-24 RX ADMIN — WATER 1000 MG: 1 INJECTION INTRAMUSCULAR; INTRAVENOUS; SUBCUTANEOUS at 20:17

## 2025-02-24 RX ADMIN — SODIUM CHLORIDE, PRESERVATIVE FREE 10 ML: 5 INJECTION INTRAVENOUS at 07:46

## 2025-02-24 RX ADMIN — BUPROPION HYDROCHLORIDE 150 MG: 150 TABLET, FILM COATED, EXTENDED RELEASE ORAL at 07:50

## 2025-02-24 RX ADMIN — ROSUVASTATIN CALCIUM 20 MG: 10 TABLET, FILM COATED ORAL at 17:33

## 2025-02-24 RX ADMIN — VENLAFAXINE HYDROCHLORIDE 225 MG: 150 CAPSULE, EXTENDED RELEASE ORAL at 07:48

## 2025-02-24 RX ADMIN — SODIUM CHLORIDE, PRESERVATIVE FREE 10 ML: 5 INJECTION INTRAVENOUS at 20:17

## 2025-02-24 RX ADMIN — IPRATROPIUM BROMIDE AND ALBUTEROL SULFATE 1 DOSE: 2.5; .5 SOLUTION RESPIRATORY (INHALATION) at 19:28

## 2025-02-24 RX ADMIN — ENOXAPARIN SODIUM 30 MG: 100 INJECTION SUBCUTANEOUS at 07:46

## 2025-02-24 RX ADMIN — GUAIFENESIN 1200 MG: 600 TABLET ORAL at 20:13

## 2025-02-24 RX ADMIN — BENZONATATE 100 MG: 100 CAPSULE ORAL at 08:46

## 2025-02-24 RX ADMIN — TRAZODONE HYDROCHLORIDE 100 MG: 50 TABLET ORAL at 20:15

## 2025-02-24 RX ADMIN — GUAIFENESIN 1200 MG: 600 TABLET ORAL at 07:47

## 2025-02-24 RX ADMIN — QUETIAPINE FUMARATE 300 MG: 100 TABLET ORAL at 20:13

## 2025-02-24 RX ADMIN — ENOXAPARIN SODIUM 30 MG: 100 INJECTION SUBCUTANEOUS at 20:13

## 2025-02-24 RX ADMIN — IPRATROPIUM BROMIDE AND ALBUTEROL SULFATE 1 DOSE: 2.5; .5 SOLUTION RESPIRATORY (INHALATION) at 11:47

## 2025-02-24 RX ADMIN — POLYETHYLENE GLYCOL 3350 17 G: 17 POWDER, FOR SOLUTION ORAL at 17:36

## 2025-02-24 RX ADMIN — LEVOTHYROXINE SODIUM 112 MCG: 0.11 TABLET ORAL at 05:25

## 2025-02-24 RX ADMIN — SODIUM CHLORIDE SOLN NEBU 3% 4 ML: 3 NEBU SOLN at 08:32

## 2025-02-24 NOTE — PROGRESS NOTES
[facility] Medicine Progress Note  V 1.6      Date of Admission: 2/20/2025    Hospital Day: 5      Chief Admission Complaint:  cough, congestion    Subjective: O2 needs slowly improving. Still with significant cough with thick sputum.    Presenting Admission History: \"75 y.o. female with past medical history of anxiety, depression, hyperlipidemia, hypothyroidism presented to emergency department with chief complaint of cough, congestion.  Patient states that she has had symptoms for the last 2 weeks.  Her son and grandson were recently diagnosed with influenza A and she thought that the symptoms she was having was because of that.  After symptoms did not resolve after a week she was evaluated and was started on Augmentin and steroids for possible bronchitis.  Patient states that she continue to take these medications as directed but was not getting any better.  When patient was too weak to complete ADLs and did not have an appetite her son had her come into emergency department for evaluation.  Patient does state that she has had intermittent sweating but has not recorded a temperature.  Does endorse some headache and dizziness.  States that when she has a significant cough that there is chest pain.  Denies any GI,  symptoms.  Denies lower extremity edema\"    Assessment/Plan:      Current Principal Problem:  Community acquired bacterial pneumonia    Acute hypoxic respiratory failure 2/2 pneumonia  - suspect gram positive  - continue ceftriaxone. Completed azithromycin  - wean O2 as able  - increase pulm toilet  - s/p IV lasix    Hypokalemia  - monitor and replete    HLD  - continue statin    Anxiety, depression  - mood stable  - continue seroquel, wellbutrin, effexor    Hypothyroidism  - continue synthroid    Ongoing threat to life and/or bodily function without ongoing treatment due to:  hypoxic resp failure    Consults:      None      --------------------------------------------------    Medications:         Infusion Medications    sodium chloride       Scheduled Medications    acetylcysteine  600 mg Inhalation BID RT    sodium chloride (Inhalant)  4 mL Nebulization BID    buPROPion  150 mg Oral Daily    levothyroxine  112 mcg Oral Daily    QUEtiapine  300 mg Oral Daily    rosuvastatin  20 mg Oral QPM    venlafaxine  225 mg Oral Daily    cefTRIAXone (ROCEPHIN) IV  1,000 mg IntraVENous Daily    guaiFENesin  1,200 mg Oral BID    ipratropium 0.5 mg-albuterol 2.5 mg  1 Dose Inhalation Q4H WA RT    sodium chloride flush  5-40 mL IntraVENous 2 times per day    enoxaparin  30 mg SubCUTAneous BID     PRN Meds: guaiFENesin-dextromethorphan, benzonatate, polyvinyl alcohol, traZODone, benzocaine-menthol, sodium chloride flush, sodium chloride, ondansetron **OR** ondansetron, polyethylene glycol, acetaminophen **OR** acetaminophen      Physical Exam Performed:      General appearance:  No apparent distress  Respiratory:  Normal respiratory effort.  4L per NC.  Bibasilar crackles; diminished overall  Cardiovascular:  Regular rate and rhythm.  Abdomen:  Soft, non-tender, non-distended.  Musculoskeletal:  No edema  Neurologic:  Non-focal  Psychiatric:  Alert and oriented    BP (!) 114/52   Pulse 85   Temp 98.1 °F (36.7 °C) (Oral)   Resp 18   Ht 1.702 m (5' 7\")   Wt 101.2 kg (223 lb)   SpO2 93%   BMI 34.93 kg/m²     Telemetry:      Personally reviewed and interpreted telemetry (Rhythm Strip) on 2/24/2025 with the following findings: Not on telemetry    Diet: ADULT DIET; Regular    DVT Prophylaxis: [x]PPx LMWH  []SQ Heparin  []IPC/SCDs  []Eliquis  []Xarelto  []Coumadin  [] Heparin Drip  []Other -      Code status: Full Code    PT/OT Eval Status:   []NOT yet ordered  []Ordered and Pending   [x]Seen with Recommendations for:   [x]Home independently  []Home w/ assist  []HHC  []SNF  []Acute Rehab    Multi-Disciplinary Rounds with Case Management completed on 2/24/2025 with the following recommendations:  Anticipated Discharge

## 2025-02-24 NOTE — PROGRESS NOTES
Pt assessment completed and charted. VSS. Pt a/ox4. Pt denies pain. Pt ambulates in room w/o assistance. Pt on 2L NC. Pt reporting that the 2L is comfortable. Most of the day pt has been about 92% on the 2L. titrate pt slowly because the last two days when attempting to decrease o2 below 2L NC, pt has dropped every time. Will titrate more as pt tolerates. Pt denies any other needs at this time.  Pt calls out appropriately.         Pt is a fall risk;  -Bed in lowest position and wheels locked.  -Call light within reach.   -Bedside table within reach.   -Non-skid footwear in place.

## 2025-02-24 NOTE — PLAN OF CARE
Problem: Safety - Adult  Goal: Free from fall injury  2/24/2025 0832 by Noris Gan RN  Outcome: Progressing  Flowsheets (Taken 2/24/2025 0832)  Free From Fall Injury:   Instruct family/caregiver on patient safety   Based on caregiver fall risk screen, instruct family/caregiver to ask for assistance with transferring infant if caregiver noted to have fall risk factors     Problem: Pain  Goal: Verbalizes/displays adequate comfort level or baseline comfort level  2/24/2025 0832 by Noris Gan RN  Outcome: Progressing  Flowsheets (Taken 2/24/2025 0832)  Verbalizes/displays adequate comfort level or baseline comfort level:   Encourage patient to monitor pain and request assistance   Assess pain using appropriate pain scale   Administer analgesics based on type and severity of pain and evaluate response   Implement non-pharmacological measures as appropriate and evaluate response     Problem: Respiratory - Adult  Goal: Achieves optimal ventilation and oxygenation  2/24/2025 0832 by Noris Gan RN  Flowsheets (Taken 2/24/2025 0832)  Achieves optimal ventilation and oxygenation:   Assess for changes in respiratory status   Assess for changes in mentation and behavior     Problem: Infection - Adult  Goal: Absence of infection at discharge  2/24/2025 0832 by Noris Gan RN  Outcome: Progressing  Flowsheets (Taken 2/24/2025 0832)  Absence of infection at discharge: Assess and monitor for signs and symptoms of infection

## 2025-02-24 NOTE — PLAN OF CARE
Problem: Safety - Adult  Goal: Free from fall injury  2/24/2025 0919 by Shanae Le  Outcome: Progressing  Flowsheets (Taken 2/24/2025 0919)  Free From Fall Injury:   Instruct family/caregiver on patient safety   Based on caregiver fall risk screen, instruct family/caregiver to ask for assistance with transferring infant if caregiver noted to have fall risk factors     Problem: Pain  Goal: Verbalizes/displays adequate comfort level or baseline comfort level  2/24/2025 0919 by Shanae Le  Outcome: Progressing  Flowsheets (Taken 2/24/2025 0919)  Verbalizes/displays adequate comfort level or baseline comfort level:   Encourage patient to monitor pain and request assistance   Assess pain using appropriate pain scale     Problem: Respiratory - Adult  Goal: Achieves optimal ventilation and oxygenation  2/24/2025 0919 by Shanae Le  Outcome: Progressing  Flowsheets (Taken 2/24/2025 0919)  Achieves optimal ventilation and oxygenation: Assess for changes in respiratory status

## 2025-02-24 NOTE — CARE COORDINATION
Chart reviewed day 4. Continues on O2 at 2LNC with sat of 94%. Has persistent cough. IPTA does not feel will have any DCP needs. Hopeful to wean O2. Felicia Hayward RN

## 2025-02-24 NOTE — PLAN OF CARE
Problem: Safety - Adult  Goal: Free from fall injury  2/23/2025 1929 by Sonu Justice RN  Outcome: Progressing  Flowsheets (Taken 2/23/2025 1929)  Free From Fall Injury:   Instruct family/caregiver on patient safety   Based on caregiver fall risk screen, instruct family/caregiver to ask for assistance with transferring infant if caregiver noted to have fall risk factors     Problem: Pain  Goal: Verbalizes/displays adequate comfort level or baseline comfort level  2/23/2025 1929 by Sonu Justice RN  Outcome: Progressing  Flowsheets (Taken 2/23/2025 1929)  Verbalizes/displays adequate comfort level or baseline comfort level:   Encourage patient to monitor pain and request assistance   Assess pain using appropriate pain scale   Administer analgesics based on type and severity of pain and evaluate response   Implement non-pharmacological measures as appropriate and evaluate response     Problem: Respiratory - Adult  Goal: Achieves optimal ventilation and oxygenation  2/23/2025 1929 by Sonu Justice RN  Outcome: Progressing  Flowsheets (Taken 2/23/2025 1929)  Achieves optimal ventilation and oxygenation:   Assess for changes in respiratory status   Assess for changes in mentation and behavior   Position to facilitate oxygenation and minimize respiratory effort   Oxygen supplementation based on oxygen saturation or arterial blood gases   Initiate smoking cessation protocol as indicated   Encourage broncho-pulmonary hygiene including cough, deep breathe, incentive spirometry   Assess the need for suctioning and aspirate as needed   Assess and instruct to report shortness of breath or any respiratory difficulty     Problem: Infection - Adult  Goal: Absence of infection at discharge  2/23/2025 1929 by Sonu Justice RN  Outcome: Progressing  Flowsheets (Taken 2/23/2025 1929)  Absence of infection at discharge:   Assess and monitor for signs and symptoms of infection   Monitor lab/diagnostic results   Monitor all  insertion sites i.e., indwelling lines, tubes and drains   [place] appropriate cooling/warming therapies per order   Administer medications as ordered   Identify and instruct in appropriate isolation precautions for identified infection/condition   Instruct and encourage patient and family to use good hand hygiene technique     Problem: Respiratory - Adult  Goal: Achieves optimal ventilation and oxygenation  2/23/2025 1929 by Sonu Justice, RN  Outcome: Progressing  Flowsheets (Taken 2/23/2025 1929)  Achieves optimal ventilation and oxygenation:   Assess for changes in respiratory status   Assess for changes in mentation and behavior   Position to facilitate oxygenation and minimize respiratory effort   Oxygen supplementation based on oxygen saturation or arterial blood gases   Initiate smoking cessation protocol as indicated   Encourage broncho-pulmonary hygiene including cough, deep breathe, incentive spirometry   Assess the need for suctioning and aspirate as needed   Assess and instruct to report shortness of breath or any respiratory difficulty  2/23/2025 0901 by Noris Gan, RN  Outcome: Not Progressing  Flowsheets (Taken 2/23/2025 0901)  Achieves optimal ventilation and oxygenation:   Assess for changes in respiratory status   Oxygen supplementation based on oxygen saturation or arterial blood gases

## 2025-02-25 PROCEDURE — 99223 1ST HOSP IP/OBS HIGH 75: CPT | Performed by: INTERNAL MEDICINE

## 2025-02-25 PROCEDURE — 6360000002 HC RX W HCPCS: Performed by: STUDENT IN AN ORGANIZED HEALTH CARE EDUCATION/TRAINING PROGRAM

## 2025-02-25 PROCEDURE — 6370000000 HC RX 637 (ALT 250 FOR IP): Performed by: STUDENT IN AN ORGANIZED HEALTH CARE EDUCATION/TRAINING PROGRAM

## 2025-02-25 PROCEDURE — 2700000000 HC OXYGEN THERAPY PER DAY

## 2025-02-25 PROCEDURE — 94660 CPAP INITIATION&MGMT: CPT

## 2025-02-25 PROCEDURE — 94669 MECHANICAL CHEST WALL OSCILL: CPT

## 2025-02-25 PROCEDURE — 94761 N-INVAS EAR/PLS OXIMETRY MLT: CPT

## 2025-02-25 PROCEDURE — 2500000003 HC RX 250 WO HCPCS: Performed by: STUDENT IN AN ORGANIZED HEALTH CARE EDUCATION/TRAINING PROGRAM

## 2025-02-25 PROCEDURE — 2060000000 HC ICU INTERMEDIATE R&B

## 2025-02-25 PROCEDURE — 6360000002 HC RX W HCPCS: Performed by: INTERNAL MEDICINE

## 2025-02-25 PROCEDURE — 94640 AIRWAY INHALATION TREATMENT: CPT

## 2025-02-25 PROCEDURE — 6370000000 HC RX 637 (ALT 250 FOR IP): Performed by: INTERNAL MEDICINE

## 2025-02-25 RX ORDER — FUROSEMIDE 10 MG/ML
20 INJECTION INTRAMUSCULAR; INTRAVENOUS DAILY
Status: DISCONTINUED | OUTPATIENT
Start: 2025-02-25 | End: 2025-03-02 | Stop reason: HOSPADM

## 2025-02-25 RX ORDER — ACETYLCYSTEINE 200 MG/ML
600 SOLUTION ORAL; RESPIRATORY (INHALATION)
Status: COMPLETED | OUTPATIENT
Start: 2025-02-25 | End: 2025-02-27

## 2025-02-25 RX ORDER — BISACODYL 5 MG/1
5 TABLET, DELAYED RELEASE ORAL DAILY PRN
Status: DISCONTINUED | OUTPATIENT
Start: 2025-02-25 | End: 2025-03-02 | Stop reason: HOSPADM

## 2025-02-25 RX ADMIN — ENOXAPARIN SODIUM 30 MG: 100 INJECTION SUBCUTANEOUS at 20:16

## 2025-02-25 RX ADMIN — IPRATROPIUM BROMIDE AND ALBUTEROL SULFATE 1 DOSE: 2.5; .5 SOLUTION RESPIRATORY (INHALATION) at 19:29

## 2025-02-25 RX ADMIN — TRAZODONE HYDROCHLORIDE 100 MG: 50 TABLET ORAL at 20:17

## 2025-02-25 RX ADMIN — QUETIAPINE FUMARATE 300 MG: 100 TABLET ORAL at 20:16

## 2025-02-25 RX ADMIN — IPRATROPIUM BROMIDE AND ALBUTEROL SULFATE 1 DOSE: 2.5; .5 SOLUTION RESPIRATORY (INHALATION) at 07:51

## 2025-02-25 RX ADMIN — ACETYLCYSTEINE 600 MG: 200 INHALANT RESPIRATORY (INHALATION) at 19:29

## 2025-02-25 RX ADMIN — ROSUVASTATIN CALCIUM 20 MG: 10 TABLET, FILM COATED ORAL at 18:07

## 2025-02-25 RX ADMIN — GUAIFENESIN 1200 MG: 600 TABLET ORAL at 09:32

## 2025-02-25 RX ADMIN — BENZONATATE 100 MG: 100 CAPSULE ORAL at 10:28

## 2025-02-25 RX ADMIN — LEVOTHYROXINE SODIUM 112 MCG: 0.11 TABLET ORAL at 06:16

## 2025-02-25 RX ADMIN — ACETYLCYSTEINE 600 MG: 200 INHALANT RESPIRATORY (INHALATION) at 07:51

## 2025-02-25 RX ADMIN — VENLAFAXINE HYDROCHLORIDE 225 MG: 150 CAPSULE, EXTENDED RELEASE ORAL at 09:32

## 2025-02-25 RX ADMIN — FUROSEMIDE 20 MG: 10 INJECTION, SOLUTION INTRAMUSCULAR; INTRAVENOUS at 18:07

## 2025-02-25 RX ADMIN — BISACODYL 5 MG: 5 TABLET, COATED ORAL at 10:26

## 2025-02-25 RX ADMIN — SODIUM CHLORIDE, PRESERVATIVE FREE 10 ML: 5 INJECTION INTRAVENOUS at 20:18

## 2025-02-25 RX ADMIN — ENOXAPARIN SODIUM 30 MG: 100 INJECTION SUBCUTANEOUS at 09:32

## 2025-02-25 RX ADMIN — GUAIFENESIN 1200 MG: 600 TABLET ORAL at 20:16

## 2025-02-25 RX ADMIN — IPRATROPIUM BROMIDE AND ALBUTEROL SULFATE 1 DOSE: 2.5; .5 SOLUTION RESPIRATORY (INHALATION) at 16:13

## 2025-02-25 RX ADMIN — BUPROPION HYDROCHLORIDE 150 MG: 150 TABLET, FILM COATED, EXTENDED RELEASE ORAL at 09:32

## 2025-02-25 RX ADMIN — IPRATROPIUM BROMIDE AND ALBUTEROL SULFATE 1 DOSE: 2.5; .5 SOLUTION RESPIRATORY (INHALATION) at 12:16

## 2025-02-25 NOTE — PROGRESS NOTES
Chap met with Pt during rounding.   Pt expressed being overwhelmed with many areas of life. Pt is struggling with health situation and balancing work.   Pt expressed openness to all religions, believes in a God, and Pt's current significant other is a Christian.   She graciously accepted prayer and expressed gratitude.     Ad Chilel

## 2025-02-25 NOTE — CARE COORDINATION
Chart reviewed day 5. Following for respiratory improvement. Pulm consult pending. ? Mucus plugging. Following for possible new O2 need. Felicia Hayward RN

## 2025-02-25 NOTE — CONSULTS
PULMONARY AND CRITICAL CARE INPATIENT NOTE        Muriel Thomas   : 1950  MRN: [id number]     Admitting Physician: Chilo Farley DO  Attending Physician: Ugo Burr MD  PCP: McGilligan, Becky, MD    Date of Service: 2025        ASSESSMENT & PLAN       75 y.o. female patient was admitted on 2025 with  Chief Complaint   Patient presents with    Shortness of Breath     Patient reports diagnosed with bronchitis a few days ago by urgent care, started on augmentin, went to urgent care today for increased SOB and congestion. Per EMS pt was 85% on RA, denies pulmonary history. Pt was 93% on RA at rest upon arrival. Pt diagnosed with FLU last week    Influenza       Acute hypoxic respiratory failure  Multifocal pneumonia  Bilateral lower lobe collapse consolidation  Small bilateral pleural effusions    HLD, anxiety/depression, hypothyroidism      Plan:              Patient completed NT biotic course appropriately  Continue Mucinex, flutter valve  Will add Volara System (Oscillation & Lung Expansion Therapy) with Mucomyst  BiPAP at bedtime to help expand collapse dependent segments of the lung  Repeat chest x-ray in the morning  If the patient does not improve we will consider bronchoscopy  Diuresis as tolerated with electrolyte replacement  Continue to wean oxygen with target 90 to 94%.  Pulmonary toilet  Aspiration precautions  DVT ppx measures.      LOS: Hospital Day: 6     Code:Full Code        Subjective/Objective           CC/Reason for Consult: Pneumonia, mucous plugging      HPI: 2025  75-year-old female patient with PMH of HLD, anxiety and depression, hypothyroidism who was admitted on  with chest congestion and cough which has been going on for the last 2 weeks.  Patient had been in contact with sick children and grandchildren with influenza A.  Patient was treated with antibiotics at that patient breath but did not improve    Patient was mildly tachycardic but afebrile on

## 2025-02-25 NOTE — PLAN OF CARE
Ambulates independently with steady gait. Unable to wean O2, maintains 90-92% on 1.5 lpm O2.  Problem: Safety - Adult  Goal: Free from fall injury  Outcome: Progressing     Problem: Respiratory - Adult  Goal: Achieves optimal ventilation and oxygenation  Outcome: Progressing

## 2025-02-25 NOTE — PLAN OF CARE
Problem: Safety - Adult  Goal: Free from fall injury  2/25/2025 0945 by Mikaela Stephens RN  Outcome: Progressing  2/25/2025 0631 by Mindy Thomas RN  Outcome: Progressing     Problem: Pain  Goal: Verbalizes/displays adequate comfort level or baseline comfort level  Outcome: Progressing     Problem: Respiratory - Adult  Goal: Achieves optimal ventilation and oxygenation  2/25/2025 0945 by Mikaela Stephens RN  Outcome: Progressing  2/25/2025 0631 by Mindy Thomas, RN  Outcome: Progressing     Problem: Infection - Adult  Goal: Absence of infection at discharge  Outcome: Progressing

## 2025-02-25 NOTE — PROGRESS NOTES
[facility] Medicine Progress Note  V 1.6      Date of Admission: 2/20/2025    Hospital Day: 6      Chief Admission Complaint:  cough, congestion    Subjective: O2 needs slowly improving. Still with significant cough with thick sputum.    Presenting Admission History: \"75 y.o. female with past medical history of anxiety, depression, hyperlipidemia, hypothyroidism presented to emergency department with chief complaint of cough, congestion.  Patient states that she has had symptoms for the last 2 weeks.  Her son and grandson were recently diagnosed with influenza A and she thought that the symptoms she was having was because of that.  After symptoms did not resolve after a week she was evaluated and was started on Augmentin and steroids for possible bronchitis.  Patient states that she continue to take these medications as directed but was not getting any better.  When patient was too weak to complete ADLs and did not have an appetite her son had her come into emergency department for evaluation.  Patient does state that she has had intermittent sweating but has not recorded a temperature.  Does endorse some headache and dizziness.  States that when she has a significant cough that there is chest pain.  Denies any GI,  symptoms.  Denies lower extremity edema\"    Assessment/Plan:      Current Principal Problem:  Community acquired bacterial pneumonia    Acute hypoxic respiratory failure 2/2 pneumonia  - pulm consulted  - suspect gram positive  - continue ceftriaxone. Completed azithromycin  - wean O2 as able  - increase pulm toilet  - s/p IV lasix    Hypokalemia  - monitor and replete    HLD  - continue statin    Anxiety, depression  - mood stable  - continue seroquel, wellbutrin, effexor    Hypothyroidism  - continue synthroid    Ongoing threat to life and/or bodily function without ongoing treatment due to:  hypoxic resp failure    Consults:      IP CONSULT TO PULMONOLOGY

## 2025-02-26 ENCOUNTER — APPOINTMENT (OUTPATIENT)
Dept: GENERAL RADIOLOGY | Age: 75
DRG: 871 | End: 2025-02-26
Payer: MEDICARE

## 2025-02-26 ENCOUNTER — ANESTHESIA EVENT (OUTPATIENT)
Dept: ENDOSCOPY | Age: 75
End: 2025-02-26
Payer: MEDICARE

## 2025-02-26 LAB
BACTERIA BLD CULT ORG #2: NORMAL
BACTERIA BLD CULT: NORMAL

## 2025-02-26 PROCEDURE — 99233 SBSQ HOSP IP/OBS HIGH 50: CPT | Performed by: INTERNAL MEDICINE

## 2025-02-26 PROCEDURE — 6370000000 HC RX 637 (ALT 250 FOR IP): Performed by: STUDENT IN AN ORGANIZED HEALTH CARE EDUCATION/TRAINING PROGRAM

## 2025-02-26 PROCEDURE — 94660 CPAP INITIATION&MGMT: CPT

## 2025-02-26 PROCEDURE — 94761 N-INVAS EAR/PLS OXIMETRY MLT: CPT

## 2025-02-26 PROCEDURE — 94669 MECHANICAL CHEST WALL OSCILL: CPT

## 2025-02-26 PROCEDURE — 2700000000 HC OXYGEN THERAPY PER DAY

## 2025-02-26 PROCEDURE — 6360000002 HC RX W HCPCS: Performed by: INTERNAL MEDICINE

## 2025-02-26 PROCEDURE — 94640 AIRWAY INHALATION TREATMENT: CPT

## 2025-02-26 PROCEDURE — 6370000000 HC RX 637 (ALT 250 FOR IP): Performed by: INTERNAL MEDICINE

## 2025-02-26 PROCEDURE — 2060000000 HC ICU INTERMEDIATE R&B

## 2025-02-26 PROCEDURE — 2500000003 HC RX 250 WO HCPCS: Performed by: STUDENT IN AN ORGANIZED HEALTH CARE EDUCATION/TRAINING PROGRAM

## 2025-02-26 PROCEDURE — 71045 X-RAY EXAM CHEST 1 VIEW: CPT

## 2025-02-26 RX ADMIN — VENLAFAXINE HYDROCHLORIDE 225 MG: 150 CAPSULE, EXTENDED RELEASE ORAL at 10:14

## 2025-02-26 RX ADMIN — SODIUM CHLORIDE, PRESERVATIVE FREE 10 ML: 5 INJECTION INTRAVENOUS at 10:27

## 2025-02-26 RX ADMIN — ACETYLCYSTEINE 600 MG: 200 INHALANT RESPIRATORY (INHALATION) at 07:43

## 2025-02-26 RX ADMIN — IPRATROPIUM BROMIDE AND ALBUTEROL SULFATE 1 DOSE: 2.5; .5 SOLUTION RESPIRATORY (INHALATION) at 19:34

## 2025-02-26 RX ADMIN — FUROSEMIDE 20 MG: 10 INJECTION, SOLUTION INTRAMUSCULAR; INTRAVENOUS at 10:14

## 2025-02-26 RX ADMIN — BISACODYL 5 MG: 5 TABLET, COATED ORAL at 13:47

## 2025-02-26 RX ADMIN — IPRATROPIUM BROMIDE AND ALBUTEROL SULFATE 1 DOSE: 2.5; .5 SOLUTION RESPIRATORY (INHALATION) at 07:43

## 2025-02-26 RX ADMIN — SODIUM CHLORIDE, PRESERVATIVE FREE 10 ML: 5 INJECTION INTRAVENOUS at 20:12

## 2025-02-26 RX ADMIN — IPRATROPIUM BROMIDE AND ALBUTEROL SULFATE 1 DOSE: 2.5; .5 SOLUTION RESPIRATORY (INHALATION) at 11:38

## 2025-02-26 RX ADMIN — GUAIFENESIN 1200 MG: 600 TABLET ORAL at 10:14

## 2025-02-26 RX ADMIN — BUPROPION HYDROCHLORIDE 150 MG: 150 TABLET, FILM COATED, EXTENDED RELEASE ORAL at 10:14

## 2025-02-26 RX ADMIN — TRAZODONE HYDROCHLORIDE 100 MG: 50 TABLET ORAL at 20:12

## 2025-02-26 RX ADMIN — ROSUVASTATIN CALCIUM 20 MG: 10 TABLET, FILM COATED ORAL at 18:43

## 2025-02-26 RX ADMIN — LEVOTHYROXINE SODIUM 112 MCG: 0.11 TABLET ORAL at 10:14

## 2025-02-26 RX ADMIN — ACETYLCYSTEINE 600 MG: 200 INHALANT RESPIRATORY (INHALATION) at 19:34

## 2025-02-26 RX ADMIN — QUETIAPINE FUMARATE 300 MG: 100 TABLET ORAL at 20:08

## 2025-02-26 RX ADMIN — IPRATROPIUM BROMIDE AND ALBUTEROL SULFATE 1 DOSE: 2.5; .5 SOLUTION RESPIRATORY (INHALATION) at 15:30

## 2025-02-26 RX ADMIN — GUAIFENESIN 1200 MG: 600 TABLET ORAL at 20:07

## 2025-02-26 ASSESSMENT — PAIN DESCRIPTION - DESCRIPTORS: DESCRIPTORS: ACHING

## 2025-02-26 NOTE — PROGRESS NOTES
02/25/25 5598   NIV Type   $NIV $Daily Charge   NIV Started/Stopped On   Equipment Type V60   Mode Bilevel   Mask Type Full face mask   Mask Size Medium   Assessment   Respirations 16   Comfort Level Good   Using Accessory Muscles No   Mask Compliance Good   Skin Assessment Clean, dry, & intact   Skin Protection for O2 Device Yes   Orientation Bilateral;Middle   Location Cheek;Nose   Intervention(s) Skin Barrier   Settings/Measurements   PIP Observed 9 cm H20   IPAP 12 cmH20   CPAP/EPAP 5 cmH2O   Vt (Measured) 692 mL   Rate Ordered 10   Insp Rise Time (%) 3 %   FiO2  30 %   I Time/ I Time % 1 s   Minute Volume (L/min) 7 Liters   Mask Leak (lpm) 17 lpm   Patient's Home Machine No   Alarm Settings   Alarms On Y   Low Pressure (cmH2O) 6 cmH2O   High Pressure (cmH2O) 30 cmH2O   Apnea (secs) 20 secs   RR Low (bpm) 6   RR High (bpm) 40 br/min

## 2025-02-26 NOTE — PROGRESS NOTES
02/26/25 0021   NIV Type   $NIV $Daily Charge   Equipment Type V60   Mode Bilevel   Mask Type Full face mask   Mask Size Medium   Assessment   Pulse 85   Respirations 17   SpO2 94 %   Comfort Level Good   Using Accessory Muscles No   Mask Compliance Good   Skin Assessment Clean, dry, & intact   Skin Protection for O2 Device Yes   Location Cheek;Nose   Settings/Measurements   PIP Observed 12 cm H20   IPAP 12 cmH20   CPAP/EPAP 5 cmH2O   Vt (Measured) 470 mL   FiO2  30 %   Minute Volume (L/min) 9 Liters   Mask Leak (lpm) 11 lpm   Patient's Home Machine No   Alarm Settings   Alarms On Y

## 2025-02-26 NOTE — PROGRESS NOTES
Recommendations for:   [x]Home independently  []Home w/ assist  []HHC  []SNF  []Acute Rehab    Multi-Disciplinary Rounds with Case Management completed on 2/26/2025 with the following recommendations:  Anticipated Discharge Location: [x]Home w/ []HHC vs []SNF  []Acute Rehab  []LTAC  []Hospice  []Other -    Anticipated Discharge Day/Date: 1-2 days  Barriers to Discharge: O2 needs  --------------------------------------------------    MDM (any 2 required for High level billing)    A. Problems (any 1)  [x] Acute/Chronic Illness/injury posing ongoing threat to life and/or bodily function without ongoing treatment    [] Severe exacerbation of chronic illness    --------------------------------------------------  B. Risk of Treatment (any 1)    [] Drugs/treatments that require intensive monitoring for toxicity    [] IV ABX (Vancomycin, Aminoglycosides, etc)     [] Post-Cath/Contrast study requiring serial monitoring    [] IV Narcotic analgesia    [] Aggressive IV diuresis    [] Hypertonic Saline    [] Critical electrolyte abnormalities requiring IV replacement    [] Insulin - Scheduled/SSI or Insulin gtt    [] Anticoagulation (Heparin gtt or Coumadin - other anticoagulants in special circumstances)    [] Cardiac Medications (IV Amiodarone/Diltiazem, Tikosyn, etc)    [] Hemodialysis    [] Other -    [] Change in code status    [] Decision to escalate care    [] Major surgery/procedure with associated risk factors    --------------------------------------------------  C. Data (any 2)    [x] Data Review (any 3)    [x] Consultant notes from yesterday/today    [x] All available current labs reviewed interpreted for clinical significance    [x] Appropriate follow-up labs were ordered  [] Collateral history obtained     [] Independent Interpretation of tests (any 1)    [] Telemetry (Rhythm Strip) personally reviewed and interpreted        [] Imaging personally reviewed and interpreted     [x] Discussion (any 1)  [x]  Multi-Disciplinary Rounds with Case Management  [x] Discussed management of the case with pulm       Labs:  Personally reviewed on 2/26/2025 and interpreted for clinical significance as documented above.     No results for input(s): \"WBC\", \"HGB\", \"HCT\", \"PLT\" in the last 72 hours.    No results for input(s): \"NA\", \"K\", \"CL\", \"CO2\", \"BUN\", \"CREATININE\", \"CALCIUM\", \"MG\", \"PHOS\" in the last 72 hours.    Recent Labs     02/24/25  0451   PROBNP 71     No results for input(s): \"LABA1C\" in the last 72 hours.  No results for input(s): \"AST\", \"ALT\", \"BILIDIR\", \"BILITOT\", \"ALKPHOS\" in the last 72 hours.    No results for input(s): \"INR\", \"LACTA\", \"TSH\" in the last 72 hours.    Urine Cultures: No results found for: \"LABURIN\"  Blood Cultures:   Lab Results   Component Value Date/Time    BC No Growth after 4 days of incubation. 02/22/2025 09:55 AM     Lab Results   Component Value Date/Time    BLOODCULT2 No Growth after 4 days of incubation. 02/22/2025 09:55 AM     Organism:   Lab Results   Component Value Date/Time    ORG Staphylococcus coagulase negative DNA Detected 02/20/2025 08:07 PM    ORG Staphylococcus coagulase-negative 02/20/2025 08:07 PM         Ugo Burr MD

## 2025-02-26 NOTE — PLAN OF CARE
Problem: Safety - Adult  Goal: Free from fall injury  2/25/2025 2022 by Sonu Justice RN  Outcome: Progressing  Flowsheets (Taken 2/25/2025 2022)  Free From Fall Injury:   Instruct family/caregiver on patient safety   Based on caregiver fall risk screen, instruct family/caregiver to ask for assistance with transferring infant if caregiver noted to have fall risk factors     Problem: Pain  Goal: Verbalizes/displays adequate comfort level or baseline comfort level  2/25/2025 2022 by Sonu Justice RN  Outcome: Progressing  Flowsheets (Taken 2/25/2025 2022)  Verbalizes/displays adequate comfort level or baseline comfort level:   Encourage patient to monitor pain and request assistance   Assess pain using appropriate pain scale   Administer analgesics based on type and severity of pain and evaluate response   Implement non-pharmacological measures as appropriate and evaluate response     Problem: Respiratory - Adult  Goal: Achieves optimal ventilation and oxygenation  2/25/2025 2022 by Sonu Justice RN  Outcome: Progressing  Flowsheets (Taken 2/25/2025 2022)  Achieves optimal ventilation and oxygenation:   Assess for changes in respiratory status   Position to facilitate oxygenation and minimize respiratory effort   Assess for changes in mentation and behavior   Oxygen supplementation based on oxygen saturation or arterial blood gases   Encourage broncho-pulmonary hygiene including cough, deep breathe, incentive spirometry   Assess the need for suctioning and aspirate as needed   Assess and instruct to report shortness of breath or any respiratory difficulty     Problem: Infection - Adult  Goal: Absence of infection at discharge  2/25/2025 2022 by Sonu Justice RN  Outcome: Progressing  Flowsheets (Taken 2/25/2025 2022)  Absence of infection at discharge:   Assess and monitor for signs and symptoms of infection   Monitor lab/diagnostic results   Monitor all insertion sites i.e., indwelling lines, tubes and  drains   [place] appropriate cooling/warming therapies per order   Administer medications as ordered   Instruct and encourage patient and family to use good hand hygiene technique

## 2025-02-26 NOTE — PROGRESS NOTES
Assessment completed and documented. VSS. A/ox4.  On 3L via NC - denies any SOB but reports of having dyspnea on exertion sometimes.  PIV line on her  L forearm - on saline locked.   Due medication given please see eMAR.  Pt ambulates independently. Bed locked and in lowest position.   Patient for transfer to [facility] room 438 - reports given to BELLA Renteria.  Bedside table and call light within reach. Denies further needs at this time.     2105 - wheeled patient to -[phone] via wheelchair. Patient belongings accounted for.

## 2025-02-26 NOTE — PROGRESS NOTES
02/26/25 0418   NIV Type   Equipment Type V60   Mode Bilevel   Mask Type Full face mask   Mask Size Medium   Assessment   Pulse 86   Respirations 13   SpO2 93 %   Comfort Level Good   Using Accessory Muscles No   Mask Compliance Good   Skin Assessment Clean, dry, & intact   Skin Protection for O2 Device Yes   Location Cheek;Nose   Settings/Measurements   PIP Observed 12 cm H20   IPAP 12 cmH20   CPAP/EPAP 5 cmH2O   Vt (Measured) 431 mL   FiO2  30 %   Minute Volume (L/min) 9 Liters   Mask Leak (lpm) 12 lpm   Patient's Home Machine No   Alarm Settings   Alarms On Y

## 2025-02-26 NOTE — PROGRESS NOTES
PULMONARY AND CRITICAL CARE INPATIENT NOTE        Muriel Thomas   : 1950  MRN: [id number]     Admitting Physician: Chilo Farley DO  Attending Physician: Ugo Burr MD  PCP: McGilligan, Becky, MD    Date of Service: 2025        ASSESSMENT & PLAN       75 y.o. female patient was admitted on 2025 with  Chief Complaint   Patient presents with    Shortness of Breath     Patient reports diagnosed with bronchitis a few days ago by urgent care, started on augmentin, went to urgent care today for increased SOB and congestion. Per EMS pt was 85% on RA, denies pulmonary history. Pt was 93% on RA at rest upon arrival. Pt diagnosed with FLU last week    Influenza       Acute hypoxic respiratory failure  Multifocal pneumonia  Bilateral lower lobe collapse consolidation  Small bilateral pleural effusions    HLD, anxiety/depression, hypothyroidism      Plan:              Patient completed antibiotics  Continue Mucinex, flutter valve  Continue Volara System (Oscillation & Lung Expansion Therapy) with Mucomyst  BiPAP at bedtime to help expand collapse dependent segments of the lung  Repeat chest x-ray in the morning  Keep n.p.o. after midnight for possible bronchoscopy tomorrow.  If the patient clinically and radiologically improved we will cancel  Diuresis as tolerated with electrolyte replacement  Continue to wean oxygen with target 90 to 94%.  Pulmonary toilet  Aspiration precautions  DVT ppx measures.  Hold for possible bronchoscopy      LOS: Hospital Day: 7     Code:Full Code        Subjective/Objective     Interval History: Encounter 2025  Patient remained afebrile and hemodynamically stable  She went on BiPAP 30% FiO2 overnight  I's and O's +700 cc  Patient remained on Mucomyst, Lasix 20 mg IV daily, Mucinex, DuoNebs unable to make results  No new CBC or chemistry  Chest x-ray this morning continues to show basilar atelectasis especially on the left lower lung  Patient feels some improvement with

## 2025-02-27 ENCOUNTER — APPOINTMENT (OUTPATIENT)
Dept: GENERAL RADIOLOGY | Age: 75
DRG: 871 | End: 2025-02-27
Payer: MEDICARE

## 2025-02-27 ENCOUNTER — ANESTHESIA (OUTPATIENT)
Dept: ENDOSCOPY | Age: 75
End: 2025-02-27
Payer: MEDICARE

## 2025-02-27 PROCEDURE — 94761 N-INVAS EAR/PLS OXIMETRY MLT: CPT

## 2025-02-27 PROCEDURE — 2500000003 HC RX 250 WO HCPCS: Performed by: STUDENT IN AN ORGANIZED HEALTH CARE EDUCATION/TRAINING PROGRAM

## 2025-02-27 PROCEDURE — 3700000001 HC ADD 15 MINUTES (ANESTHESIA): Performed by: INTERNAL MEDICINE

## 2025-02-27 PROCEDURE — 71045 X-RAY EXAM CHEST 1 VIEW: CPT

## 2025-02-27 PROCEDURE — 94660 CPAP INITIATION&MGMT: CPT

## 2025-02-27 PROCEDURE — 5A09357 ASSISTANCE WITH RESPIRATORY VENTILATION, LESS THAN 24 CONSECUTIVE HOURS, CONTINUOUS POSITIVE AIRWAY PRESSURE: ICD-10-PCS | Performed by: STUDENT IN AN ORGANIZED HEALTH CARE EDUCATION/TRAINING PROGRAM

## 2025-02-27 PROCEDURE — 99233 SBSQ HOSP IP/OBS HIGH 50: CPT | Performed by: INTERNAL MEDICINE

## 2025-02-27 PROCEDURE — 6370000000 HC RX 637 (ALT 250 FOR IP): Performed by: ANESTHESIOLOGY

## 2025-02-27 PROCEDURE — 7100000010 HC PHASE II RECOVERY - FIRST 15 MIN: Performed by: INTERNAL MEDICINE

## 2025-02-27 PROCEDURE — 2500000003 HC RX 250 WO HCPCS: Performed by: INTERNAL MEDICINE

## 2025-02-27 PROCEDURE — 0B9B8ZZ DRAINAGE OF LEFT LOWER LOBE BRONCHUS, VIA NATURAL OR ARTIFICIAL OPENING ENDOSCOPIC: ICD-10-PCS | Performed by: INTERNAL MEDICINE

## 2025-02-27 PROCEDURE — 2700000000 HC OXYGEN THERAPY PER DAY

## 2025-02-27 PROCEDURE — 6360000002 HC RX W HCPCS: Performed by: INTERNAL MEDICINE

## 2025-02-27 PROCEDURE — 31645 BRNCHSC W/THER ASPIR 1ST: CPT | Performed by: INTERNAL MEDICINE

## 2025-02-27 PROCEDURE — 2580000003 HC RX 258: Performed by: NURSE ANESTHETIST, CERTIFIED REGISTERED

## 2025-02-27 PROCEDURE — 6370000000 HC RX 637 (ALT 250 FOR IP): Performed by: STUDENT IN AN ORGANIZED HEALTH CARE EDUCATION/TRAINING PROGRAM

## 2025-02-27 PROCEDURE — 7100000001 HC PACU RECOVERY - ADDTL 15 MIN: Performed by: INTERNAL MEDICINE

## 2025-02-27 PROCEDURE — 87070 CULTURE OTHR SPECIMN AEROBIC: CPT

## 2025-02-27 PROCEDURE — 3700000000 HC ANESTHESIA ATTENDED CARE: Performed by: INTERNAL MEDICINE

## 2025-02-27 PROCEDURE — 94669 MECHANICAL CHEST WALL OSCILL: CPT

## 2025-02-27 PROCEDURE — 2060000000 HC ICU INTERMEDIATE R&B

## 2025-02-27 PROCEDURE — 6370000000 HC RX 637 (ALT 250 FOR IP): Performed by: INTERNAL MEDICINE

## 2025-02-27 PROCEDURE — 2500000003 HC RX 250 WO HCPCS: Performed by: NURSE ANESTHETIST, CERTIFIED REGISTERED

## 2025-02-27 PROCEDURE — 3609010800 HC BRONCHOSCOPY ALVEOLAR LAVAGE: Performed by: INTERNAL MEDICINE

## 2025-02-27 PROCEDURE — 87205 SMEAR GRAM STAIN: CPT

## 2025-02-27 PROCEDURE — 0B9J8ZX DRAINAGE OF LEFT LOWER LUNG LOBE, VIA NATURAL OR ARTIFICIAL OPENING ENDOSCOPIC, DIAGNOSTIC: ICD-10-PCS | Performed by: INTERNAL MEDICINE

## 2025-02-27 PROCEDURE — 94640 AIRWAY INHALATION TREATMENT: CPT

## 2025-02-27 PROCEDURE — 3609010900 HC BRONCHOSCOPY THERAPUTIC ASPIRATION INITIAL: Performed by: INTERNAL MEDICINE

## 2025-02-27 PROCEDURE — 6360000002 HC RX W HCPCS: Performed by: NURSE ANESTHETIST, CERTIFIED REGISTERED

## 2025-02-27 PROCEDURE — 0B968ZZ DRAINAGE OF RIGHT LOWER LOBE BRONCHUS, VIA NATURAL OR ARTIFICIAL OPENING ENDOSCOPIC: ICD-10-PCS | Performed by: INTERNAL MEDICINE

## 2025-02-27 PROCEDURE — 31624 DX BRONCHOSCOPE/LAVAGE: CPT | Performed by: INTERNAL MEDICINE

## 2025-02-27 PROCEDURE — 7100000000 HC PACU RECOVERY - FIRST 15 MIN: Performed by: INTERNAL MEDICINE

## 2025-02-27 PROCEDURE — 2709999900 HC NON-CHARGEABLE SUPPLY: Performed by: INTERNAL MEDICINE

## 2025-02-27 RX ORDER — SODIUM CHLORIDE 9 MG/ML
INJECTION, SOLUTION INTRAVENOUS PRN
Status: DISCONTINUED | OUTPATIENT
Start: 2025-02-27 | End: 2025-02-27 | Stop reason: HOSPADM

## 2025-02-27 RX ORDER — IPRATROPIUM BROMIDE AND ALBUTEROL SULFATE 2.5; .5 MG/3ML; MG/3ML
1 SOLUTION RESPIRATORY (INHALATION) ONCE
Status: DISCONTINUED | OUTPATIENT
Start: 2025-02-27 | End: 2025-02-27

## 2025-02-27 RX ORDER — SODIUM CHLORIDE 0.9 % (FLUSH) 0.9 %
5-40 SYRINGE (ML) INJECTION EVERY 12 HOURS SCHEDULED
Status: DISCONTINUED | OUTPATIENT
Start: 2025-02-27 | End: 2025-02-27 | Stop reason: HOSPADM

## 2025-02-27 RX ORDER — IPRATROPIUM BROMIDE AND ALBUTEROL SULFATE 2.5; .5 MG/3ML; MG/3ML
1 SOLUTION RESPIRATORY (INHALATION) ONCE
Status: COMPLETED | OUTPATIENT
Start: 2025-02-27 | End: 2025-02-27

## 2025-02-27 RX ORDER — LIDOCAINE HYDROCHLORIDE 20 MG/ML
INJECTION, SOLUTION INFILTRATION; PERINEURAL
Status: DISCONTINUED | OUTPATIENT
Start: 2025-02-27 | End: 2025-02-27 | Stop reason: SDUPTHER

## 2025-02-27 RX ORDER — SODIUM CHLORIDE 0.9 % (FLUSH) 0.9 %
5-40 SYRINGE (ML) INJECTION PRN
Status: DISCONTINUED | OUTPATIENT
Start: 2025-02-27 | End: 2025-02-27 | Stop reason: HOSPADM

## 2025-02-27 RX ORDER — ROCURONIUM BROMIDE 10 MG/ML
INJECTION, SOLUTION INTRAVENOUS
Status: DISCONTINUED | OUTPATIENT
Start: 2025-02-27 | End: 2025-02-27 | Stop reason: SDUPTHER

## 2025-02-27 RX ORDER — PROPOFOL 10 MG/ML
INJECTION, EMULSION INTRAVENOUS
Status: DISCONTINUED | OUTPATIENT
Start: 2025-02-27 | End: 2025-02-27 | Stop reason: SDUPTHER

## 2025-02-27 RX ORDER — ONDANSETRON 2 MG/ML
4 INJECTION INTRAMUSCULAR; INTRAVENOUS EVERY 30 MIN PRN
Status: DISCONTINUED | OUTPATIENT
Start: 2025-02-27 | End: 2025-02-27 | Stop reason: HOSPADM

## 2025-02-27 RX ORDER — MIDAZOLAM HYDROCHLORIDE 1 MG/ML
2 INJECTION, SOLUTION INTRAMUSCULAR; INTRAVENOUS
Status: DISCONTINUED | OUTPATIENT
Start: 2025-02-27 | End: 2025-02-27 | Stop reason: HOSPADM

## 2025-02-27 RX ORDER — NALOXONE HYDROCHLORIDE 0.4 MG/ML
INJECTION, SOLUTION INTRAMUSCULAR; INTRAVENOUS; SUBCUTANEOUS PRN
Status: DISCONTINUED | OUTPATIENT
Start: 2025-02-27 | End: 2025-02-27 | Stop reason: HOSPADM

## 2025-02-27 RX ORDER — MAGNESIUM HYDROXIDE 1200 MG/15ML
LIQUID ORAL CONTINUOUS PRN
Status: COMPLETED | OUTPATIENT
Start: 2025-02-27 | End: 2025-02-27

## 2025-02-27 RX ORDER — DEXAMETHASONE SODIUM PHOSPHATE 10 MG/ML
INJECTION, SOLUTION INTRA-ARTICULAR; INTRALESIONAL; INTRAMUSCULAR; INTRAVENOUS; SOFT TISSUE
Status: DISCONTINUED | OUTPATIENT
Start: 2025-02-27 | End: 2025-02-27 | Stop reason: SDUPTHER

## 2025-02-27 RX ORDER — IPRATROPIUM BROMIDE AND ALBUTEROL SULFATE 2.5; .5 MG/3ML; MG/3ML
1 SOLUTION RESPIRATORY (INHALATION)
Status: COMPLETED | OUTPATIENT
Start: 2025-02-27 | End: 2025-02-27

## 2025-02-27 RX ORDER — LIDOCAINE HYDROCHLORIDE 10 MG/ML
1 INJECTION, SOLUTION EPIDURAL; INFILTRATION; INTRACAUDAL; PERINEURAL
Status: DISCONTINUED | OUTPATIENT
Start: 2025-02-27 | End: 2025-02-27 | Stop reason: HOSPADM

## 2025-02-27 RX ORDER — SODIUM CHLORIDE, SODIUM LACTATE, POTASSIUM CHLORIDE, CALCIUM CHLORIDE 600; 310; 30; 20 MG/100ML; MG/100ML; MG/100ML; MG/100ML
INJECTION, SOLUTION INTRAVENOUS CONTINUOUS
Status: DISCONTINUED | OUTPATIENT
Start: 2025-02-27 | End: 2025-02-27 | Stop reason: HOSPADM

## 2025-02-27 RX ORDER — OXYCODONE HYDROCHLORIDE 5 MG/1
10 TABLET ORAL PRN
Status: DISCONTINUED | OUTPATIENT
Start: 2025-02-27 | End: 2025-02-27 | Stop reason: HOSPADM

## 2025-02-27 RX ORDER — SODIUM CHLORIDE 9 MG/ML
INJECTION, SOLUTION INTRAVENOUS
Status: DISCONTINUED | OUTPATIENT
Start: 2025-02-27 | End: 2025-02-27 | Stop reason: SDUPTHER

## 2025-02-27 RX ORDER — ACETYLCYSTEINE 200 MG/ML
SOLUTION ORAL; RESPIRATORY (INHALATION) PRN
Status: DISCONTINUED | OUTPATIENT
Start: 2025-02-27 | End: 2025-02-27 | Stop reason: ALTCHOICE

## 2025-02-27 RX ORDER — OXYCODONE HYDROCHLORIDE 5 MG/1
5 TABLET ORAL PRN
Status: DISCONTINUED | OUTPATIENT
Start: 2025-02-27 | End: 2025-02-27 | Stop reason: HOSPADM

## 2025-02-27 RX ORDER — FENTANYL CITRATE 50 UG/ML
INJECTION, SOLUTION INTRAMUSCULAR; INTRAVENOUS
Status: DISCONTINUED | OUTPATIENT
Start: 2025-02-27 | End: 2025-02-27 | Stop reason: SDUPTHER

## 2025-02-27 RX ORDER — ONDANSETRON 2 MG/ML
INJECTION INTRAMUSCULAR; INTRAVENOUS
Status: DISCONTINUED | OUTPATIENT
Start: 2025-02-27 | End: 2025-02-27 | Stop reason: SDUPTHER

## 2025-02-27 RX ADMIN — FENTANYL CITRATE 50 MCG: 50 INJECTION, SOLUTION INTRAMUSCULAR; INTRAVENOUS at 12:27

## 2025-02-27 RX ADMIN — LEVOTHYROXINE SODIUM 112 MCG: 0.11 TABLET ORAL at 05:28

## 2025-02-27 RX ADMIN — SODIUM CHLORIDE, PRESERVATIVE FREE 10 ML: 5 INJECTION INTRAVENOUS at 08:55

## 2025-02-27 RX ADMIN — ROCURONIUM BROMIDE 50 MG: 50 INJECTION, SOLUTION INTRAVENOUS at 12:23

## 2025-02-27 RX ADMIN — IPRATROPIUM BROMIDE AND ALBUTEROL SULFATE 1 DOSE: 2.5; .5 SOLUTION RESPIRATORY (INHALATION) at 20:30

## 2025-02-27 RX ADMIN — SUGAMMADEX 100 MG: 100 INJECTION, SOLUTION INTRAVENOUS at 12:41

## 2025-02-27 RX ADMIN — SUGAMMADEX 300 MG: 100 INJECTION, SOLUTION INTRAVENOUS at 12:37

## 2025-02-27 RX ADMIN — GUAIFENESIN 1200 MG: 600 TABLET ORAL at 20:41

## 2025-02-27 RX ADMIN — IPRATROPIUM BROMIDE AND ALBUTEROL SULFATE 1 DOSE: 2.5; .5 SOLUTION RESPIRATORY (INHALATION) at 07:38

## 2025-02-27 RX ADMIN — GUAIFENESIN 1200 MG: 600 TABLET ORAL at 08:54

## 2025-02-27 RX ADMIN — ROSUVASTATIN CALCIUM 20 MG: 10 TABLET, FILM COATED ORAL at 17:33

## 2025-02-27 RX ADMIN — BISACODYL 5 MG: 5 TABLET, COATED ORAL at 08:55

## 2025-02-27 RX ADMIN — IPRATROPIUM BROMIDE AND ALBUTEROL SULFATE 1 DOSE: 2.5; .5 SOLUTION RESPIRATORY (INHALATION) at 13:55

## 2025-02-27 RX ADMIN — QUETIAPINE FUMARATE 300 MG: 100 TABLET ORAL at 20:41

## 2025-02-27 RX ADMIN — FUROSEMIDE 20 MG: 10 INJECTION, SOLUTION INTRAMUSCULAR; INTRAVENOUS at 08:55

## 2025-02-27 RX ADMIN — IPRATROPIUM BROMIDE AND ALBUTEROL SULFATE 1 DOSE: 2.5; .5 SOLUTION RESPIRATORY (INHALATION) at 15:00

## 2025-02-27 RX ADMIN — IPRATROPIUM BROMIDE AND ALBUTEROL SULFATE 1 DOSE: 2.5; .5 SOLUTION RESPIRATORY (INHALATION) at 11:32

## 2025-02-27 RX ADMIN — BUPROPION HYDROCHLORIDE 150 MG: 150 TABLET, FILM COATED, EXTENDED RELEASE ORAL at 08:55

## 2025-02-27 RX ADMIN — LIDOCAINE HYDROCHLORIDE 80 MG: 20 INJECTION, SOLUTION INFILTRATION; PERINEURAL at 12:23

## 2025-02-27 RX ADMIN — DEXAMETHASONE SODIUM PHOSPHATE 5 MG: 10 INJECTION INTRAMUSCULAR; INTRAVENOUS at 12:26

## 2025-02-27 RX ADMIN — SODIUM CHLORIDE: 9 INJECTION, SOLUTION INTRAVENOUS at 12:16

## 2025-02-27 RX ADMIN — PROPOFOL 150 MG: 10 INJECTION, EMULSION INTRAVENOUS at 12:23

## 2025-02-27 RX ADMIN — VENLAFAXINE HYDROCHLORIDE 225 MG: 150 CAPSULE, EXTENDED RELEASE ORAL at 08:55

## 2025-02-27 RX ADMIN — ACETYLCYSTEINE 600 MG: 200 INHALANT RESPIRATORY (INHALATION) at 07:38

## 2025-02-27 RX ADMIN — SODIUM CHLORIDE, PRESERVATIVE FREE 10 ML: 5 INJECTION INTRAVENOUS at 20:41

## 2025-02-27 RX ADMIN — ONDANSETRON 4 MG: 2 INJECTION INTRAMUSCULAR; INTRAVENOUS at 12:26

## 2025-02-27 RX ADMIN — ENOXAPARIN SODIUM 30 MG: 100 INJECTION SUBCUTANEOUS at 20:41

## 2025-02-27 RX ADMIN — TRAZODONE HYDROCHLORIDE 100 MG: 50 TABLET ORAL at 20:41

## 2025-02-27 ASSESSMENT — PAIN SCALES - WONG BAKER
WONGBAKER_NUMERICALRESPONSE: NO HURT

## 2025-02-27 ASSESSMENT — PAIN SCALES - GENERAL
PAINLEVEL_OUTOF10: 6
PAINLEVEL_OUTOF10: 0
PAINLEVEL_OUTOF10: 6

## 2025-02-27 ASSESSMENT — PAIN - FUNCTIONAL ASSESSMENT: PAIN_FUNCTIONAL_ASSESSMENT: 0-10

## 2025-02-27 NOTE — PROGRESS NOTES
02/27/25 1510   Oxygen Therapy/Pulse Ox   O2 Therapy Oxygen   O2 Device Nasal cannula   O2 Flow Rate (L/min) 6 L/min   SpO2 96 %        negative...

## 2025-02-27 NOTE — ANESTHESIA POSTPROCEDURE EVALUATION
Department of Anesthesiology  Postprocedure Note    Patient: Muriel Thomas  MRN: [id number]  YOB: 1950  Date of evaluation: 2/27/2025    Procedure Summary       Date: 02/27/25 Room / Location: [place] [address] / [facility]    Anesthesia Start: 1218 Anesthesia Stop: 1303    Procedures:       BRONCHOSCOPY ALVEOLAR LAVAGE      BRONCHOSCOPY THERAPEUTIC ASPIRATION INITIAL Diagnosis:       Pneumonia of left lower lobe due to infectious organism      (Pneumonia of left lower lobe due to infectious organism [J18.9])    Surgeons: Thaddeus Núñez MD Responsible Provider: Benjamin Black MD    Anesthesia Type: MAC ASA Status: 3            Anesthesia Type: No value filed.    Pop Phase I: Pop Score: 10    Pop Phase II:      Anesthesia Post Evaluation    Patient location during evaluation: PACU  Level of consciousness: awake  Nausea & Vomiting: no vomiting  Cardiovascular status: blood pressure returned to baseline  Respiratory status: acceptable  Hydration status: stable  Pain management: adequate    No notable events documented.

## 2025-02-27 NOTE — ANESTHESIA PRE PROCEDURE
Department of Anesthesiology  Preprocedure Note       Name:  Muriel Thomas   Age:  75 y.o.  :  1950                                          MRN:  [id number]         Date:  2025      Surgeon: Surgeon(s):  Thaddeus Núñez MD    Procedure: Procedure(s):  BRONCHOSCOPY ALVEOLAR LAVAGE    Medications prior to admission:   Prior to Admission medications    Medication Sig Start Date End Date Taking? Authorizing Provider   rosuvastatin (CRESTOR) 20 MG tablet Take 1 tablet by mouth every evening 25 Yes Provider, Historical, MD   MOUNJARO 5 MG/0.5ML SOAJ INJECT 5 MG SUBCUTANEOUSLY ONE TIME PER WEEK 25  Yes Dori Starr MD   traZODone (DESYREL) 50 MG tablet TAKE 1 TO 2 TABLETS BY MOUTH EVERY NIGHT AT BEDTIME AS NEEDED 25  Yes Dori Starr MD   oxyCODONE (ROXICODONE) 5 MG immediate release tablet  24  Yes ProviderDori MD   latanoprost (XALATAN) 0.005 % ophthalmic solution INSTILL 1 DROP INTO BOTH EYES IN THE EVENING   Yes ProviderDori MD   brimonidine (ALPHAGAN) 0.2 % ophthalmic solution INSTILL 1 DROP INTO LEFT EYE TWICE A DAY 24  Yes ProviderDori MD   benzonatate (TESSALON) 100 MG capsule Take 1-2 capsules by mouth 3 times daily as needed for Cough 25 Yes Rosibel Hays, APRN - CNP   buPROPion (WELLBUTRIN SR) 150 MG extended release tablet Take 1 tablet by mouth daily   Yes ProviderDori MD   levothyroxine (SYNTHROID) 137 MCG tablet Take 112 mcg by mouth Daily    Yes Provider, MD Dori   venlafaxine (EFFEXOR-XR) 150 MG XR capsule Take 225 mg by mouth daily    Yes ProviderDori MD   lovastatin (MEVACOR) 40 MG tablet Take 1 tablet by mouth nightly   Yes ProviderDori MD   quetiapine (SEROQUEL) 200 MG tablet Take 1.5 tablets by mouth daily   Yes ProviderDori MD   NONFORMULARY Inject 0.5 Units as directed once a week For insulin resistance    ProviderDori MD   cycloSPORINE

## 2025-02-27 NOTE — PROGRESS NOTES
Received pt from IP room 438 to pre-op bay 7 to prep for bronchoscopy. Consents reviewed. Pt wearing 2 rings, 2 gold tone necklaces, and 2 silver toned bracelets. All jewelry removed and placed in labeled plastic zip lock bag which is placed in PACU nurses' station.

## 2025-02-27 NOTE — PROGRESS NOTES
02/27/25 1415   NIV Type   $NIV $Daily Charge   NIV Started/Stopped On   Equipment Type V60   Mode Bilevel   Mask Type Full face mask   Mask Size Small   Assessment   Pulse 94   Respirations 20   /60   SpO2 91 %   Breath Sounds   Breath Sounds Bilateral Diminished   Settings/Measurements   PIP Observed 12 cm H20   IPAP 12 cmH20   CPAP/EPAP 6 cmH2O   Vt (Measured) 570 mL   Rate Ordered 16   FiO2  60 %   I Time/ I Time % 1 s   Minute Volume (L/min) 10 Liters   Mask Leak (lpm) 49 lpm   Patient's Home Machine No   Alarm Settings   Alarms On Y   Low Pressure (cmH2O) 8 cmH2O   High Pressure (cmH2O) 30 cmH2O   Apnea (secs) 20 secs   RR Low (bpm) 6   RR High (bpm) 40 br/min     In [facility]. Patient still drowsy post procedure with low Spo2's shallow RR.  Placed to PAP per Anesthesiology

## 2025-02-27 NOTE — PROGRESS NOTES
Patient spoke with RT and is refusing bipap tonight. Patient reported unable to sleep last night as to why she doesn't want to wear it tonight.

## 2025-02-27 NOTE — PROGRESS NOTES
[facility] Medicine Progress Note  V 1.6      Date of Admission: 2/20/2025    Hospital Day: 8      Chief Admission Complaint:  cough, congestion    Subjective:  SOB improving. Tolerated bronch well.    Presenting Admission History: \"75 y.o. female with past medical history of anxiety, depression, hyperlipidemia, hypothyroidism presented to emergency department with chief complaint of cough, congestion.  Patient states that she has had symptoms for the last 2 weeks.  Her son and grandson were recently diagnosed with influenza A and she thought that the symptoms she was having was because of that.  After symptoms did not resolve after a week she was evaluated and was started on Augmentin and steroids for possible bronchitis.  Patient states that she continue to take these medications as directed but was not getting any better.  When patient was too weak to complete ADLs and did not have an appetite her son had her come into emergency department for evaluation.  Patient does state that she has had intermittent sweating but has not recorded a temperature.  Does endorse some headache and dizziness.  States that when she has a significant cough that there is chest pain.  Denies any GI,  symptoms.  Denies lower extremity edema\"    Assessment/Plan:      Current Principal Problem:  Community acquired bacterial pneumonia    Acute hypoxic respiratory failure 2/2 pneumonia  - pulm consulted  - suspect gram positive  - continue ceftriaxone. Completed azithromycin  - wean O2 as able  - increase pulm toilet  - s/p IV lasix  - s/p bronch, BAL pending    Hypokalemia  - monitor and replete    HLD  - continue statin    Anxiety, depression  - mood stable  - continue seroquel, wellbutrin, effexor    Hypothyroidism  - continue synthroid    Ongoing threat to life and/or bodily function without ongoing treatment due to:  hypoxic resp failure    Consults:      IP CONSULT TO PULMONOLOGY      Pulmonology consulted and appreciated.

## 2025-02-27 NOTE — PROCEDURES
PROCEDURE NOTE  Date: 2/27/2025   Name: Muriel Thomas  YOB: 1950    Procedures    PRE-PROCEDURE  DIAGNOSIS: Mucous plugging, left lower lobe collapse  POST-PROCEDURE  DIAGNOSIS: Mucous plugging, left lower lobe collapse    PRE-PROCEDURE ASSESSMENT AND CONSENT:   A full history and physical was performed. The patient is 75-year-old female patient with hypoxemia and left lower lobe collapse that persisted despite noninvasive pulmonary toilet measures and antibiotics.  The patient's medications, allergies and sensitivities have been reviewed. The risks and benefits of the procedure were discussed with the patient. All questions were answered and informed consent was obtained.     PHYSICAL EXAM PRIOR TO PROCEDURE:  Airway Examination: Mallampati Class 2.  Decreased energy over the lung bases more on the left side.  No concerning findings on cardiovascular exam. ASA Grade Assessment: 4.     After reviewing the risks and benefits, the patient was deemed in satisfactory condition to undergo the procedure (benefits: more diagnostic info, risks: bleeding, infection, pneumothorax, death). The anesthesia plan was to use general anesthesia.     Immediately prior to administration of medications, the patient was re-assessed for adequacy to receive sedatives. The heart rate, respiratory rate, oxygen saturations, blood pressure, adequacy of pulmonary ventilation, and response to care were monitored throughout the procedure.     MEDICATIONS:   Endobronchial normal saline: 250 ml.   Mucomyst 20% 8 ml.  Sedation by anesthesia      PROCEDURE AND FINDINGS:  A time-out was called verifying the patient's identification immediately before the procedure, then, the scope was advanced through the ETT to the trachea and lower airways.  The lower trachea was normal in caliber. The manny was sharp. The tracheobronchial tree of the bilateral lungs was examined.   No concerning endobronchial lesions.   Large amount of mucous plugs  suctioned from left lower lobe bronchus and right lower lobe bronchus     The bronchoalveolar lavage (BAL) was collected from the left lower lobe.  150 ml of normal saline instilled and ~27 ml of returned fluid collected.     Endobronchial washings collected from both lungs.     The procedure was accomplished without difficulty. The patient tolerated the procedure well with no immediate complications.  Estimated blood loss was less than 5 ml.       IMPRESSION:   Normal bronchial tree anatomy.  No concerning endobronchial lesions.  Therapeutic aspiration of mucous plugs completed from left lower lobe and right lower lobe bronchi.  BAL obtained from left lower lobe.  Endobronchial washings collected from both lungs.      RECOMMENDATIONS:  Await test results.  Continue bronchial hygiene measures.    _______________________________  Electronically signed by:  Thaddeus Núñez MD,FACP    2/27/2025    12:39 PM.     [facility]  [address], [place] [address]   Phone (office): [phone]

## 2025-02-27 NOTE — PLAN OF CARE
Problem: Safety - Adult  Goal: Free from fall injury  Outcome: Progressing     Problem: Pain  Goal: Verbalizes/displays adequate comfort level or baseline comfort level  Outcome: Progressing     Problem: Respiratory - Adult  Goal: Achieves optimal ventilation and oxygenation  Outcome: Progressing     Problem: Infection - Adult  Goal: Absence of infection at discharge  Outcome: Progressing  Goal: Absence of infection during hospitalization  Outcome: Progressing  Goal: Absence of fever/infection during anticipated neutropenic period  Outcome: Progressing     Problem: Discharge Planning  Goal: Discharge to home or other facility with appropriate resources  Outcome: Progressing

## 2025-02-27 NOTE — PROGRESS NOTES
Pt arrives in PACU restomg quietly.  Resp easy and regular.  VS stable.  Pt awake and talking with the staff.  NS infusing.  150cc LTC.  Report from Franca BLANCO from [facility].

## 2025-02-27 NOTE — CARE COORDINATION
Dr Burr updated writer, pt getting bronch now, will stay at least overnight.  CM following for potential new home oxygen.  CM will continue to follow pt's progress and coordinate discharge arrangements as appropriate.  KIRSTY Winters-BELLA

## 2025-02-27 NOTE — PROGRESS NOTES
PULMONARY AND CRITICAL CARE INPATIENT NOTE        Muriel Thomas   : 1950  MRN: [id number]     Admitting Physician: Chilo Farley DO  Attending Physician: Ugo Burr MD  PCP: McGilligan, Becky, MD    Date of Service: 2025        ASSESSMENT & PLAN       75 y.o. female patient was admitted on 2025 with  Chief Complaint   Patient presents with    Shortness of Breath     Patient reports diagnosed with bronchitis a few days ago by urgent care, started on augmentin, went to urgent care today for increased SOB and congestion. Per EMS pt was 85% on RA, denies pulmonary history. Pt was 93% on RA at rest upon arrival. Pt diagnosed with FLU last week    Influenza       Acute hypoxic respiratory failure  Multifocal pneumonia.  Completed antibiotic course  Bilateral lower lobe collapse consolidation  Small bilateral pleural effusions    HLD, anxiety/depression, hypothyroidism      Plan:              Continue Mucinex, flutter valve  Continue Volara System (Oscillation & Lung Expansion Therapy) with Mucomyst  BiPAP at bedtime to help expand collapse dependent segments of the lung  Keep n.p.o. after midnight for possible bronchoscopy today.   Diuresis as tolerated with electrolyte replacement  Continue to wean oxygen with target 90 to 94%.  Aspiration precautions  DVT ppx measures.  Hold for bronchoscopy today      LOS: Hospital Day: 8     Code:Full Code        Subjective/Objective     Interval History: Encounter 2025  Patient remained afebrile and hemodynamically stable on 3 L of oxygen  I's and O's +300 cc  She remained on Lasix 20 mg IV daily, DuoNebs, Mucinex  No new positive micro results  Repeat chest x-ray today continues to show atelectasis of the lung base on the left side mainly        CC/Reason for Consult: Pneumonia, mucous plugging      HPI: 2025  75-year-old female patient with PMH of HLD, anxiety and depression, hypothyroidism who was admitted on  with chest congestion and      [facility]  [address], [place] [address]   Phone (office): [phone]

## 2025-02-27 NOTE — PROGRESS NOTES
02/27/25 1415   Breath Sounds   Breath Sounds Bilateral Diminished   Oxygen Therapy/Pulse Ox   FiO2  60 %   Pulse 94   Respirations 20   SpO2 91 %   Patient Transport   Time Spent Transporting 75-90   Transport Ventillation Type Noninvasive   Transport From Other  ([facility])   Transport Destination Other  ([facility]U)

## 2025-02-28 PROCEDURE — 6370000000 HC RX 637 (ALT 250 FOR IP): Performed by: NURSE PRACTITIONER

## 2025-02-28 PROCEDURE — 6360000002 HC RX W HCPCS: Performed by: INTERNAL MEDICINE

## 2025-02-28 PROCEDURE — 2700000000 HC OXYGEN THERAPY PER DAY

## 2025-02-28 PROCEDURE — 6370000000 HC RX 637 (ALT 250 FOR IP): Performed by: STUDENT IN AN ORGANIZED HEALTH CARE EDUCATION/TRAINING PROGRAM

## 2025-02-28 PROCEDURE — 94640 AIRWAY INHALATION TREATMENT: CPT

## 2025-02-28 PROCEDURE — 99233 SBSQ HOSP IP/OBS HIGH 50: CPT | Performed by: INTERNAL MEDICINE

## 2025-02-28 PROCEDURE — 6370000000 HC RX 637 (ALT 250 FOR IP): Performed by: INTERNAL MEDICINE

## 2025-02-28 PROCEDURE — 2500000003 HC RX 250 WO HCPCS: Performed by: STUDENT IN AN ORGANIZED HEALTH CARE EDUCATION/TRAINING PROGRAM

## 2025-02-28 PROCEDURE — 2060000000 HC ICU INTERMEDIATE R&B

## 2025-02-28 PROCEDURE — 94669 MECHANICAL CHEST WALL OSCILL: CPT

## 2025-02-28 PROCEDURE — 94761 N-INVAS EAR/PLS OXIMETRY MLT: CPT

## 2025-02-28 RX ADMIN — SODIUM CHLORIDE, PRESERVATIVE FREE 10 ML: 5 INJECTION INTRAVENOUS at 21:07

## 2025-02-28 RX ADMIN — QUETIAPINE FUMARATE 300 MG: 100 TABLET ORAL at 21:06

## 2025-02-28 RX ADMIN — IPRATROPIUM BROMIDE AND ALBUTEROL SULFATE 1 DOSE: 2.5; .5 SOLUTION RESPIRATORY (INHALATION) at 16:40

## 2025-02-28 RX ADMIN — GUAIFENESIN 1200 MG: 600 TABLET ORAL at 09:20

## 2025-02-28 RX ADMIN — VENLAFAXINE HYDROCHLORIDE 225 MG: 150 CAPSULE, EXTENDED RELEASE ORAL at 09:20

## 2025-02-28 RX ADMIN — BUPROPION HYDROCHLORIDE 150 MG: 150 TABLET, FILM COATED, EXTENDED RELEASE ORAL at 09:20

## 2025-02-28 RX ADMIN — TRAZODONE HYDROCHLORIDE 100 MG: 50 TABLET ORAL at 21:06

## 2025-02-28 RX ADMIN — LEVOTHYROXINE SODIUM 112 MCG: 0.11 TABLET ORAL at 05:37

## 2025-02-28 RX ADMIN — ROSUVASTATIN CALCIUM 20 MG: 10 TABLET, FILM COATED ORAL at 18:31

## 2025-02-28 RX ADMIN — FUROSEMIDE 20 MG: 10 INJECTION, SOLUTION INTRAMUSCULAR; INTRAVENOUS at 09:21

## 2025-02-28 RX ADMIN — GUAIFENESIN 1200 MG: 600 TABLET ORAL at 21:06

## 2025-02-28 RX ADMIN — IPRATROPIUM BROMIDE AND ALBUTEROL SULFATE 1 DOSE: 2.5; .5 SOLUTION RESPIRATORY (INHALATION) at 08:54

## 2025-02-28 RX ADMIN — SODIUM CHLORIDE, PRESERVATIVE FREE 10 ML: 5 INJECTION INTRAVENOUS at 09:21

## 2025-02-28 RX ADMIN — ENOXAPARIN SODIUM 30 MG: 100 INJECTION SUBCUTANEOUS at 21:06

## 2025-02-28 RX ADMIN — BISACODYL 5 MG: 5 TABLET, COATED ORAL at 18:32

## 2025-02-28 RX ADMIN — BENZOCAINE: 0.1 GEL TOPICAL at 21:17

## 2025-02-28 RX ADMIN — IPRATROPIUM BROMIDE AND ALBUTEROL SULFATE 1 DOSE: 2.5; .5 SOLUTION RESPIRATORY (INHALATION) at 20:30

## 2025-02-28 RX ADMIN — ENOXAPARIN SODIUM 30 MG: 100 INJECTION SUBCUTANEOUS at 09:21

## 2025-02-28 RX ADMIN — IPRATROPIUM BROMIDE AND ALBUTEROL SULFATE 1 DOSE: 2.5; .5 SOLUTION RESPIRATORY (INHALATION) at 11:53

## 2025-02-28 ASSESSMENT — PAIN SCALES - GENERAL
PAINLEVEL_OUTOF10: 0
PAINLEVEL_OUTOF10: 5

## 2025-02-28 ASSESSMENT — PAIN DESCRIPTION - LOCATION: LOCATION: MOUTH

## 2025-02-28 ASSESSMENT — PAIN DESCRIPTION - DESCRIPTORS: DESCRIPTORS: SORE

## 2025-02-28 NOTE — PLAN OF CARE
Problem: Safety - Adult  Goal: Free from fall injury  Outcome: Progressing     Problem: Pain  Goal: Verbalizes/displays adequate comfort level or baseline comfort level  Outcome: Progressing     Problem: Respiratory - Adult  Goal: Achieves optimal ventilation and oxygenation  Outcome: Progressing     Problem: Infection - Adult  Goal: Absence of infection at discharge  Outcome: Progressing  Flowsheets (Taken 2/28/2025 1000 by Mohinder Dejesus, RN)  Absence of infection at discharge: Assess and monitor for signs and symptoms of infection  Goal: Absence of infection during hospitalization  Outcome: Progressing  Flowsheets (Taken 2/28/2025 1000 by Mohinder Dejesus, RN)  Absence of infection during hospitalization: Assess and monitor for signs and symptoms of infection  Goal: Absence of fever/infection during anticipated neutropenic period  Outcome: Progressing  Flowsheets (Taken 2/28/2025 1000 by Mohinder Dejesus, RN)  Absence of fever/infection during anticipated neutropenic period: Monitor white blood cell count     Problem: Discharge Planning  Goal: Discharge to home or other facility with appropriate resources  Outcome: Progressing  Flowsheets (Taken 2/28/2025 1000 by Mohinder Dejesus, RN)  Discharge to home or other facility with appropriate resources: Identify barriers to discharge with patient and caregiver     Problem: Nutrition Deficit:  Goal: Optimize nutritional status  Outcome: Progressing

## 2025-02-28 NOTE — PROGRESS NOTES
[facility] Medicine Progress Note  V 1.6      Date of Admission: 2/20/2025    Hospital Day: 9      Chief Admission Complaint:  cough, congestion    Subjective:  SOB improving. Tolerated bronch well.    Presenting Admission History: \"75 y.o. female with past medical history of anxiety, depression, hyperlipidemia, hypothyroidism presented to emergency department with chief complaint of cough, congestion.  Patient states that she has had symptoms for the last 2 weeks.  Her son and grandson were recently diagnosed with influenza A and she thought that the symptoms she was having was because of that.  After symptoms did not resolve after a week she was evaluated and was started on Augmentin and steroids for possible bronchitis.  Patient states that she continue to take these medications as directed but was not getting any better.  When patient was too weak to complete ADLs and did not have an appetite her son had her come into emergency department for evaluation.  Patient does state that she has had intermittent sweating but has not recorded a temperature.  Does endorse some headache and dizziness.  States that when she has a significant cough that there is chest pain.  Denies any GI,  symptoms.  Denies lower extremity edema\"    Assessment/Plan:      Current Principal Problem:  Community acquired bacterial pneumonia    Acute hypoxic respiratory failure 2/2 pneumonia  - pulm consulted  - suspect gram positive  - completed ceftriaxone, azithromycin  - wean O2 as able  - increase pulm toilet  - s/p IV lasix  - s/p bronch, BAL pending    Hypokalemia  - monitor and replete    HLD  - continue statin    Anxiety, depression  - mood stable  - continue seroquel, wellbutrin, effexor    Hypothyroidism  - continue synthroid    Ongoing threat to life and/or bodily function without ongoing treatment due to:  hypoxic resp failure    Consults:      IP CONSULT TO PULMONOLOGY      Pulmonology consulted and appreciated.  Available

## 2025-02-28 NOTE — PLAN OF CARE
Problem: Safety - Adult  Goal: Free from fall injury  2/28/2025 0038 by Juan Francis  Outcome: Progressing  2/27/2025 1946 by Olimpia Montaño RN  Outcome: Progressing     Problem: Pain  Goal: Verbalizes/displays adequate comfort level or baseline comfort level  2/28/2025 0038 by Juan Francis  Outcome: Progressing  2/27/2025 1946 by Olimpia Montaño RN  Outcome: Progressing     Problem: Respiratory - Adult  Goal: Achieves optimal ventilation and oxygenation  2/28/2025 0038 by Juan Francis  Outcome: Progressing  2/27/2025 1946 by Olimpia Montaño RN  Outcome: Progressing     Problem: Infection - Adult  Goal: Absence of infection at discharge  2/28/2025 0038 by Juan Francis  Outcome: Progressing  Flowsheets (Taken 2/27/2025 1950)  Absence of infection at discharge:   Assess and monitor for signs and symptoms of infection   Monitor lab/diagnostic results   Monitor all insertion sites i.e., indwelling lines, tubes and drains   Monitor endotracheal (as able) and nasal secretions for changes in amount and color   [place] appropriate cooling/warming therapies per order   Administer medications as ordered   Instruct and encourage patient and family to use good hand hygiene technique   Identify and instruct in appropriate isolation precautions for identified infection/condition  2/27/2025 1946 by Olimpia Montaño RN  Outcome: Progressing  Goal: Absence of infection during hospitalization  2/28/2025 0038 by Juan Francis  Outcome: Progressing  Flowsheets (Taken 2/27/2025 1950)  Absence of infection during hospitalization:   Assess and monitor for signs and symptoms of infection   Monitor lab/diagnostic results   Monitor all insertion sites i.e., indwelling lines, tubes and drains   Monitor endotracheal (as able) and nasal secretions for changes in amount and color   [place] appropriate cooling/warming therapies per order   Administer medications as ordered   Instruct and encourage patient and family to use good hand  hygiene technique   Identify and instruct in appropriate isolation precautions for identified infection/condition  2/27/2025 1946 by Olimpia Montaño, RN  Outcome: Progressing  Goal: Absence of fever/infection during anticipated neutropenic period  2/28/2025 0038 by Juan Francis  Outcome: Progressing  Flowsheets (Taken 2/27/2025 1950)  Absence of fever/infection during anticipated neutropenic period:   Monitor white blood cell count   Administer growth factors as ordered   Implement neutropenic guidelines  2/27/2025 1946 by Olimpia Montaño, RN  Outcome: Progressing     Problem: Discharge Planning  Goal: Discharge to home or other facility with appropriate resources  2/28/2025 0038 by Juan Francis  Outcome: Progressing  Flowsheets (Taken 2/27/2025 1950)  Discharge to home or other facility with appropriate resources:   Identify barriers to discharge with patient and caregiver   Arrange for needed discharge resources and transportation as appropriate   Identify discharge learning needs (meds, wound care, etc)   Arrange for interpreters to assist at discharge as needed   Refer to discharge planning if patient needs post-hospital services based on physician order or complex needs related to functional status, cognitive ability or social support system  2/27/2025 1946 by Olimpia Montaño, RN  Outcome: Progressing

## 2025-02-28 NOTE — PROGRESS NOTES
Pt returned from Bronchoscopy on Bipap and was switched over to 6L NC. She was reduced to 5L with an O2 of 92. Pt's vitals stable and resting in bed.

## 2025-02-28 NOTE — PROGRESS NOTES
Comprehensive Nutrition Assessment    Type and Reason for Visit:  Initial, LOS    Nutrition Recommendations/Plan:   Continue regular diet.  Consider more aggressive bowel regimen d/t constipation.  Encourage PO intakes.  Monitor po intakes, nutrition adequacy, weights, pertinent labs, BMs      Malnutrition Assessment:  Malnutrition Status:  At risk for malnutrition (02/28/25 1406)    Context:  Acute Illness         Nutrition Assessment:    LOS assessment: Pt has PMH of acute MI, HLD. Admitted with pneumonia. S/p bronchoscopy d/t mucous plugging, left lower lobe collapse on 2/27. Pt reports appetite is improving. Appetite was very poor when pt was feeling ill per pt. Pt reported she noticed a slight wt change d/t poor po intakes for over a week and her UBW is ~185 lbs. CBW is 177 lbs. Pt states she has been constipated x 10 days while being on a bowel regimen. Offered prune juice to pt. Pt agreeable. Pt reported she has constipation troubles at home. Gave pt constipation nutrition therapy handouts. Pt verbalized understanding and denied having any questions. Will continue to monitor.    Nutrition Related Findings:    No BM recorded. Constipated- on bowel regimen. Wound Type: None       Current Nutrition Intake & Therapies:    Average Meal Intake: 51-75%, %  Average Supplements Intake: None Ordered  ADULT DIET; Regular    Anthropometric Measures:  Height: 170.2 cm (5' 7\")  Ideal Body Weight (IBW): 135 lbs (61 kg)       Current Body Weight: 80.3 kg (177 lb),   IBW. Weight Source: Standing scale  Current BMI (kg/m2): 27.7           Weight Adjustment For: No Adjustment                 BMI Categories: Overweight (BMI 25.0-29.9)    Estimated Daily Nutrient Needs:  Energy Requirements Based On: Kcal/kg  Weight Used for Energy Requirements: Current  Energy (kcal/day): 1830 - 2135 (30 - 35 kcals/kg IBW)  Weight Used for Protein Requirements: Ideal  Protein (g/day): 73 - 85 (1.2 - 1.4 g/kg IBW)  Method Used for Fluid  Requirements: 1 ml/kcal  Fluid (ml/day): 9920 - 2130    Nutrition Diagnosis:   Inadequate oral intake related to decreased appetite as evidenced by poor intake prior to admission, constipation    Nutrition Interventions:   Food and/or Nutrient Delivery: Continue Current Diet  Nutrition Education/Counseling: Education/Counseling initiated  Coordination of Nutrition Care: Continue to monitor while inpatient     Nutrition Education:  Educated on nutrition therapy for constipation   Learners: Patient  Readiness: Eager  Method: Explanation and Handout  Response: Verbalizes Understanding  Contact name and number provided.   Goals:  Goals: PO intake 50% or greater, prior to discharge  Type of Goal: New goal       Nutrition Monitoring and Evaluation:   Behavioral-Environmental Outcomes: None Identified  Food/Nutrient Intake Outcomes: Food and Nutrient Intake  Physical Signs/Symptoms Outcomes: Constipation, Weight, Meal Time Behavior, Nutrition Focused Physical Findings    Discharge Planning:    Continue current diet     Ruma Cheung, dietetic intern   Contact: [phone]

## 2025-02-28 NOTE — CARE COORDINATION
CM update LOS 8- Pt is being weaned off of O2. Will follow. Should DC home when able.   Kandace Sparks RN, CM

## 2025-02-28 NOTE — PROGRESS NOTES
PULMONARY AND CRITICAL CARE INPATIENT NOTE        Muriel Thomas   : 1950  MRN: [id number]     Admitting Physician: Chilo Farley DO  Attending Physician: Ugo Burr MD  PCP: McGilligan, Becky, MD    Date of Service: 2025        ASSESSMENT & PLAN       75 y.o. female patient was admitted on 2025 with  Chief Complaint   Patient presents with    Shortness of Breath     Patient reports diagnosed with bronchitis a few days ago by urgent care, started on augmentin, went to urgent care today for increased SOB and congestion. Per EMS pt was 85% on RA, denies pulmonary history. Pt was 93% on RA at rest upon arrival. Pt diagnosed with FLU last week    Influenza       Acute hypoxic respiratory failure - improved  Multifocal pneumonia.  Completed antibiotic course  Bilateral lower lobe collapse consolidation - s/p aspiration of mucus plugs yesterday  Small bilateral pleural effusions    HLD, anxiety/depression, hypothyroidism      Plan:              Continue Mucinex, flutter valve  Continue Volara System (Oscillation & Lung Expansion Therapy) with Mucomyst  BiPAP at bedtime to help expand collapse dependent segments of the lung  Diuresis as tolerated with electrolyte replacement  Continue to wean oxygen with target 90 to 94%.  Aspiration precautions  DVT ppx measures.    Increase activity  Likely can be discharged home tmrw with albuterol MDI. No Hx chronic lung disease  Will sign off. Call with any questions/copncerns    LOS: Hospital Day: 9     Code:Full Code        Subjective/Objective     Interval History: Encounter 2025  Patient remained afebrile and hemodynamically stable. Now on RA with O2 sat 95%  I's and O's +550 cc  She remained on Lasix 20 mg IV daily, DuoNebs, Mucinex  BAL Cx unrevealing to date  States she feels much better after bronch        CC/Reason for Consult: Pneumonia, mucous plugging      HPI: 2025  75-year-old female patient with PMH of HLD, anxiety and depression,

## 2025-03-01 LAB
BACTERIA SPEC RESP CULT: NORMAL
BACTERIA SPEC RESP CULT: NORMAL
GRAM STN SPEC: NORMAL
GRAM STN SPEC: NORMAL

## 2025-03-01 PROCEDURE — 2700000000 HC OXYGEN THERAPY PER DAY

## 2025-03-01 PROCEDURE — 6370000000 HC RX 637 (ALT 250 FOR IP): Performed by: STUDENT IN AN ORGANIZED HEALTH CARE EDUCATION/TRAINING PROGRAM

## 2025-03-01 PROCEDURE — 2500000003 HC RX 250 WO HCPCS: Performed by: STUDENT IN AN ORGANIZED HEALTH CARE EDUCATION/TRAINING PROGRAM

## 2025-03-01 PROCEDURE — 94669 MECHANICAL CHEST WALL OSCILL: CPT

## 2025-03-01 PROCEDURE — 6360000002 HC RX W HCPCS: Performed by: INTERNAL MEDICINE

## 2025-03-01 PROCEDURE — 94640 AIRWAY INHALATION TREATMENT: CPT

## 2025-03-01 PROCEDURE — 2060000000 HC ICU INTERMEDIATE R&B

## 2025-03-01 PROCEDURE — 94761 N-INVAS EAR/PLS OXIMETRY MLT: CPT

## 2025-03-01 PROCEDURE — 6370000000 HC RX 637 (ALT 250 FOR IP): Performed by: INTERNAL MEDICINE

## 2025-03-01 RX ADMIN — TRAZODONE HYDROCHLORIDE 100 MG: 50 TABLET ORAL at 21:01

## 2025-03-01 RX ADMIN — IPRATROPIUM BROMIDE AND ALBUTEROL SULFATE 1 DOSE: 2.5; .5 SOLUTION RESPIRATORY (INHALATION) at 16:13

## 2025-03-01 RX ADMIN — POLYETHYLENE GLYCOL 3350 17 G: 17 POWDER, FOR SOLUTION ORAL at 08:42

## 2025-03-01 RX ADMIN — GUAIFENESIN 1200 MG: 600 TABLET ORAL at 21:01

## 2025-03-01 RX ADMIN — GUAIFENESIN 1200 MG: 600 TABLET ORAL at 08:40

## 2025-03-01 RX ADMIN — FUROSEMIDE 20 MG: 10 INJECTION, SOLUTION INTRAMUSCULAR; INTRAVENOUS at 08:40

## 2025-03-01 RX ADMIN — ENOXAPARIN SODIUM 30 MG: 100 INJECTION SUBCUTANEOUS at 08:40

## 2025-03-01 RX ADMIN — ROSUVASTATIN CALCIUM 20 MG: 10 TABLET, FILM COATED ORAL at 16:45

## 2025-03-01 RX ADMIN — IPRATROPIUM BROMIDE AND ALBUTEROL SULFATE 1 DOSE: 2.5; .5 SOLUTION RESPIRATORY (INHALATION) at 11:46

## 2025-03-01 RX ADMIN — BUPROPION HYDROCHLORIDE 150 MG: 150 TABLET, FILM COATED, EXTENDED RELEASE ORAL at 08:40

## 2025-03-01 RX ADMIN — IPRATROPIUM BROMIDE AND ALBUTEROL SULFATE 1 DOSE: 2.5; .5 SOLUTION RESPIRATORY (INHALATION) at 08:17

## 2025-03-01 RX ADMIN — ENOXAPARIN SODIUM 30 MG: 100 INJECTION SUBCUTANEOUS at 21:03

## 2025-03-01 RX ADMIN — QUETIAPINE FUMARATE 300 MG: 100 TABLET ORAL at 21:01

## 2025-03-01 RX ADMIN — LEVOTHYROXINE SODIUM 112 MCG: 0.11 TABLET ORAL at 07:07

## 2025-03-01 RX ADMIN — BISACODYL 5 MG: 5 TABLET, COATED ORAL at 08:42

## 2025-03-01 RX ADMIN — SODIUM CHLORIDE, PRESERVATIVE FREE 10 ML: 5 INJECTION INTRAVENOUS at 21:04

## 2025-03-01 RX ADMIN — IPRATROPIUM BROMIDE AND ALBUTEROL SULFATE 1 DOSE: 2.5; .5 SOLUTION RESPIRATORY (INHALATION) at 20:41

## 2025-03-01 RX ADMIN — VENLAFAXINE HYDROCHLORIDE 225 MG: 150 CAPSULE, EXTENDED RELEASE ORAL at 08:40

## 2025-03-01 RX ADMIN — SODIUM CHLORIDE, PRESERVATIVE FREE 10 ML: 5 INJECTION INTRAVENOUS at 08:40

## 2025-03-01 NOTE — PROGRESS NOTES
Anticipated Discharge Day/Date: tomorrow  Barriers to Discharge: O2 needs  --------------------------------------------------    MDM (any 2 required for High level billing)    A. Problems (any 1)  [] Acute/Chronic Illness/injury posing ongoing threat to life and/or bodily function without ongoing treatment    [] Severe exacerbation of chronic illness    --------------------------------------------------  B. Risk of Treatment (any 1)    [] Drugs/treatments that require intensive monitoring for toxicity    [] IV ABX (Vancomycin, Aminoglycosides, etc)     [] Post-Cath/Contrast study requiring serial monitoring    [] IV Narcotic analgesia    [] Aggressive IV diuresis    [] Hypertonic Saline    [] Critical electrolyte abnormalities requiring IV replacement    [] Insulin - Scheduled/SSI or Insulin gtt    [] Anticoagulation (Heparin gtt or Coumadin - other anticoagulants in special circumstances)    [] Cardiac Medications (IV Amiodarone/Diltiazem, Tikosyn, etc)    [] Hemodialysis    [] Other -    [] Change in code status    [] Decision to escalate care    [] Major surgery/procedure with associated risk factors    --------------------------------------------------  C. Data (any 2)    [x] Data Review (any 3)    [x] Consultant notes from yesterday/today    [x] All available current labs reviewed interpreted for clinical significance    [x] Appropriate follow-up labs were ordered  [] Collateral history obtained     [] Independent Interpretation of tests (any 1)    [] Telemetry (Rhythm Strip) personally reviewed and interpreted        [] Imaging personally reviewed and interpreted     [] Discussion (any 1)  [] Multi-Disciplinary Rounds with Case Management  [] Discussed management of the case with [facility]       Labs:  Personally reviewed on 3/1/2025 and interpreted for clinical significance as documented above.     No results for input(s): \"WBC\", \"HGB\", \"HCT\", \"PLT\" in the last 72 hours.    No results for input(s): \"NA\", \"K\",  \"CL\", \"CO2\", \"BUN\", \"CREATININE\", \"CALCIUM\", \"MG\", \"PHOS\" in the last 72 hours.    No results for input(s): \"PROBNP\", \"TROPHS\" in the last 72 hours.    No results for input(s): \"LABA1C\" in the last 72 hours.  No results for input(s): \"AST\", \"ALT\", \"BILIDIR\", \"BILITOT\", \"ALKPHOS\" in the last 72 hours.    No results for input(s): \"INR\", \"LACTA\", \"TSH\" in the last 72 hours.    Urine Cultures: No results found for: \"LABURIN\"  Blood Cultures:   Lab Results   Component Value Date/Time    BC No Growth after 4 days of incubation. 02/22/2025 09:55 AM     Lab Results   Component Value Date/Time    BLOODCULT2 No Growth after 4 days of incubation. 02/22/2025 09:55 AM     Organism:   Lab Results   Component Value Date/Time    ORG Staphylococcus coagulase negative DNA Detected 02/20/2025 08:07 PM    ORG Staphylococcus coagulase-negative 02/20/2025 08:07 PM         Ugo Burr MD     No

## 2025-03-01 NOTE — PLAN OF CARE
Problem: Safety - Adult  Goal: Free from fall injury  3/1/2025 0910 by Che Sin RN  Outcome: Progressing  2/28/2025 2145 by Juan Francis  Outcome: Progressing     Problem: Pain  Goal: Verbalizes/displays adequate comfort level or baseline comfort level  3/1/2025 0910 by Che Sin RN  Outcome: Progressing  2/28/2025 2145 by Juan Francis  Outcome: Progressing     Problem: Respiratory - Adult  Goal: Achieves optimal ventilation and oxygenation  3/1/2025 0910 by hCe Sin RN  Outcome: Progressing  Flowsheets (Taken 3/1/2025 0830)  Achieves optimal ventilation and oxygenation: Assess for changes in respiratory status  2/28/2025 2145 by Juan Francis  Outcome: Progressing     Problem: Infection - Adult  Goal: Absence of infection at discharge  3/1/2025 0910 by Che Sin RN  Outcome: Progressing  Flowsheets (Taken 3/1/2025 0830)  Absence of infection at discharge: Assess and monitor for signs and symptoms of infection  2/28/2025 2145 by Juan Francis  Outcome: Progressing  Flowsheets (Taken 2/28/2025 2058)  Absence of infection at discharge:   Assess and monitor for signs and symptoms of infection   Monitor lab/diagnostic results   Monitor all insertion sites i.e., indwelling lines, tubes and drains   Monitor endotracheal (as able) and nasal secretions for changes in amount and color   [place] appropriate cooling/warming therapies per order   Administer medications as ordered   Instruct and encourage patient and family to use good hand hygiene technique   Identify and instruct in appropriate isolation precautions for identified infection/condition  Goal: Absence of infection during hospitalization  3/1/2025 0910 by Che Sin RN  Outcome: Progressing  2/28/2025 2145 by Juan Francis  Outcome: Progressing  Flowsheets (Taken 2/28/2025 2058)  Absence of infection during hospitalization:   Assess and monitor for signs and symptoms of infection   Monitor lab/diagnostic results   Monitor all  Juan  Outcome: Progressing

## 2025-03-01 NOTE — PLAN OF CARE
Problem: Safety - Adult  Goal: Free from fall injury  2/28/2025 2145 by Juan Francis  Outcome: Progressing  2/28/2025 1642 by Olimpia Montaño RN  Outcome: Progressing     Problem: Pain  Goal: Verbalizes/displays adequate comfort level or baseline comfort level  2/28/2025 2145 by Juan Francis  Outcome: Progressing  2/28/2025 1642 by Olimpia Montaño RN  Outcome: Progressing     Problem: Respiratory - Adult  Goal: Achieves optimal ventilation and oxygenation  2/28/2025 2145 by Juan Francis  Outcome: Progressing  2/28/2025 1642 by Olimpia Montaño RN  Outcome: Progressing     Problem: Infection - Adult  Goal: Absence of infection at discharge  2/28/2025 2145 by Juan Francis  Outcome: Progressing  Flowsheets (Taken 2/28/2025 2058)  Absence of infection at discharge:   Assess and monitor for signs and symptoms of infection   Monitor lab/diagnostic results   Monitor all insertion sites i.e., indwelling lines, tubes and drains   Monitor endotracheal (as able) and nasal secretions for changes in amount and color   [place] appropriate cooling/warming therapies per order   Administer medications as ordered   Instruct and encourage patient and family to use good hand hygiene technique   Identify and instruct in appropriate isolation precautions for identified infection/condition  2/28/2025 1642 by Olimpia Montaño RN  Outcome: Progressing  Flowsheets (Taken 2/28/2025 1000 by Mohinder Dejesus, RN)  Absence of infection at discharge: Assess and monitor for signs and symptoms of infection  Goal: Absence of infection during hospitalization  2/28/2025 2145 by Juan Francis  Outcome: Progressing  Flowsheets (Taken 2/28/2025 2058)  Absence of infection during hospitalization:   Assess and monitor for signs and symptoms of infection   Monitor lab/diagnostic results   Monitor all insertion sites i.e., indwelling lines, tubes and drains   Monitor endotracheal (as able) and nasal secretions for changes in amount and color    with patient and caregiver     Problem: Nutrition Deficit:  Goal: Optimize nutritional status  2/28/2025 2145 by Juan Francis  Outcome: Progressing  2/28/2025 1642 by Olimpia Montaño RN  Outcome: Progressing

## 2025-03-02 VITALS
BODY MASS INDEX: 27.75 KG/M2 | TEMPERATURE: 98.1 F | OXYGEN SATURATION: 91 % | RESPIRATION RATE: 16 BRPM | HEART RATE: 98 BPM | WEIGHT: 176.8 LBS | HEIGHT: 67 IN | DIASTOLIC BLOOD PRESSURE: 63 MMHG | SYSTOLIC BLOOD PRESSURE: 100 MMHG

## 2025-03-02 PROCEDURE — 94640 AIRWAY INHALATION TREATMENT: CPT

## 2025-03-02 PROCEDURE — 6370000000 HC RX 637 (ALT 250 FOR IP): Performed by: STUDENT IN AN ORGANIZED HEALTH CARE EDUCATION/TRAINING PROGRAM

## 2025-03-02 PROCEDURE — 94761 N-INVAS EAR/PLS OXIMETRY MLT: CPT

## 2025-03-02 PROCEDURE — 6360000002 HC RX W HCPCS: Performed by: INTERNAL MEDICINE

## 2025-03-02 PROCEDURE — 2500000003 HC RX 250 WO HCPCS: Performed by: STUDENT IN AN ORGANIZED HEALTH CARE EDUCATION/TRAINING PROGRAM

## 2025-03-02 PROCEDURE — 2700000000 HC OXYGEN THERAPY PER DAY

## 2025-03-02 PROCEDURE — 6370000000 HC RX 637 (ALT 250 FOR IP): Performed by: INTERNAL MEDICINE

## 2025-03-02 PROCEDURE — 94669 MECHANICAL CHEST WALL OSCILL: CPT

## 2025-03-02 RX ORDER — GUAIFENESIN 600 MG/1
1200 TABLET, EXTENDED RELEASE ORAL 2 TIMES DAILY
Qty: 30 TABLET | Refills: 0 | Status: SHIPPED | OUTPATIENT
Start: 2025-03-02

## 2025-03-02 RX ORDER — ALBUTEROL SULFATE 90 UG/1
2 INHALANT RESPIRATORY (INHALATION) 4 TIMES DAILY PRN
Qty: 18 G | Refills: 0 | Status: SHIPPED | OUTPATIENT
Start: 2025-03-02

## 2025-03-02 RX ADMIN — VENLAFAXINE HYDROCHLORIDE 225 MG: 150 CAPSULE, EXTENDED RELEASE ORAL at 08:32

## 2025-03-02 RX ADMIN — GUAIFENESIN 1200 MG: 600 TABLET ORAL at 08:32

## 2025-03-02 RX ADMIN — FUROSEMIDE 20 MG: 10 INJECTION, SOLUTION INTRAMUSCULAR; INTRAVENOUS at 08:32

## 2025-03-02 RX ADMIN — IPRATROPIUM BROMIDE AND ALBUTEROL SULFATE 1 DOSE: 2.5; .5 SOLUTION RESPIRATORY (INHALATION) at 12:03

## 2025-03-02 RX ADMIN — BUPROPION HYDROCHLORIDE 150 MG: 150 TABLET, FILM COATED, EXTENDED RELEASE ORAL at 08:32

## 2025-03-02 RX ADMIN — LEVOTHYROXINE SODIUM 112 MCG: 0.11 TABLET ORAL at 05:48

## 2025-03-02 RX ADMIN — IPRATROPIUM BROMIDE AND ALBUTEROL SULFATE 1 DOSE: 2.5; .5 SOLUTION RESPIRATORY (INHALATION) at 08:28

## 2025-03-02 RX ADMIN — ENOXAPARIN SODIUM 30 MG: 100 INJECTION SUBCUTANEOUS at 08:32

## 2025-03-02 RX ADMIN — SODIUM CHLORIDE, PRESERVATIVE FREE 10 ML: 5 INJECTION INTRAVENOUS at 08:32

## 2025-03-02 RX ADMIN — BISACODYL 5 MG: 5 TABLET, COATED ORAL at 09:11

## 2025-03-02 RX ADMIN — POLYETHYLENE GLYCOL 3350 17 G: 17 POWDER, FOR SOLUTION ORAL at 09:11

## 2025-03-02 NOTE — PROGRESS NOTES
O2 saturation at REST on ROOM AIR = __89____%     If saturation is 89% or above please proceed with steps 2 and 3..........     O2 saturation with AMBULATION of __20___ feet on ROOM AIR = __84___%   O2 saturation with AMBULATION on current 2 liter flow = ____90__%

## 2025-03-02 NOTE — DISCHARGE INSTR - COC
completed shifts:  In: 1270 [P.O.:1260; I.V.:10]  Out: 0     Safety Concerns:     None    Impairments/Disabilities:      None    Nutrition Therapy:  Current Nutrition Therapy:   - Oral Diet:  General carb control    Routes of Feeding: Oral  Liquids: Thin Liquids  Daily Fluid Restriction: no  Last Modified Barium Swallow with Video (Video Swallowing Test): not done    Treatments at the Time of Hospital Discharge:   Respiratory Treatments: NA  Oxygen Therapy:  is not on home oxygen therapy.  Ventilator:    - No ventilator support    Rehab Therapies: Physical Therapy and Occupational Therapy  Weight Bearing Status/Restrictions: No weight bearing restrictions  Other Medical Equipment (for information only, NOT a DME order):  walker and hospital bed  Other Treatments: NA    Patient's personal belongings (please select all that are sent with patient):  {[facility] DME Belongings:278452795}    RN SIGNATURE:  Electronically signed by Che Sin RN on 3/2/25 at 1:58 PM EST    CASE MANAGEMENT/SOCIAL WORK SECTION    Inpatient Status Date: ***    Readmission Risk Assessment Score:  [facility] RISK OF UNPLANNED READMISSION 2.0             8.4 Total Score        Discharging to Facility/ Agency   Name:   Address:  Phone:  Fax:    Dialysis Facility (if applicable)   Name:  Address:  Dialysis Schedule:  Phone:  Fax:    / signature: {Esignature:[id number]}    PHYSICIAN SECTION    Prognosis: {Prognosis:1831589140}    Condition at Discharge: { Patient Condition:056394553}    Rehab Potential (if transferring to Rehab): {Prognosis:5333330718}    Recommended Labs or Other Treatments After Discharge: ***    Physician Certification: I certify the above information and transfer of Muriel Thomas  is necessary for the continuing treatment of the diagnosis listed and that she requires {Admit to Appropriate Level of Care:22551} for {GREATER/LESS:292079594} 30 days.     Update Admission H&P: {[facility] DME Changes in  HandP:983354440}    PHYSICIAN SIGNATURE:  {Esignature:[id number]}

## 2025-03-02 NOTE — PROGRESS NOTES
Care assumed from previous RN. A&O, VSS, assessment complete as documented. Lungs diminished but no adventitious breath sounds noted, SpO2 90%+ on 1L O2 via nasal cannula, pt denies SOB other than on exertion. States she will call for ambulation assistance if weakness of legs or dizziness occurs. Denies pain or other needs at this time.

## 2025-03-02 NOTE — PROGRESS NOTES
Patient discharge completed. IV removed. Discharge information included information on diagnosis including signs and symptoms, complications and when to seek medical attention. Information on new medications also provided included use for the medication, side effects and when to call the doctor. Patient verbalized understanding of all discharge information. Patient escorted out by staff with all documented belongings. Home with family.

## 2025-03-02 NOTE — CARE COORDINATION
CASE MANAGEMENT DISCHARGE SUMMARY      Discharge to: Home     IMM given: (date) 2/28    New Durable Medical Equipment ordered/agency: [facility] o2    Transportation:    Family/car: yes      Confirmed discharge plan with:     Patient: yes      Family:  no per pt     RN, name: Che RN    Note: Discharging nurse to complete SESAR, reconcile AVS, and place final copy with patient's discharge packet. RN to ensure that written prescriptions for  Level II medications are sent with patient to the facility as per protocol.

## 2025-03-02 NOTE — PLAN OF CARE
Problem: Safety - Adult  Goal: Free from fall injury  Outcome: Progressing     Problem: Pain  Goal: Verbalizes/displays adequate comfort level or baseline comfort level  Outcome: Progressing     Problem: Respiratory - Adult  Goal: Achieves optimal ventilation and oxygenation  Outcome: Progressing  Flowsheets (Taken 3/2/2025 0830)  Achieves optimal ventilation and oxygenation: Assess for changes in respiratory status     Problem: Infection - Adult  Goal: Absence of infection at discharge  Outcome: Progressing  Flowsheets (Taken 3/2/2025 0830)  Absence of infection at discharge: Assess and monitor for signs and symptoms of infection  Goal: Absence of infection during hospitalization  Outcome: Progressing  Goal: Absence of fever/infection during anticipated neutropenic period  Outcome: Progressing     Problem: Discharge Planning  Goal: Discharge to home or other facility with appropriate resources  Outcome: Progressing  Flowsheets (Taken 3/2/2025 0830)  Discharge to home or other facility with appropriate resources:   Identify barriers to discharge with patient and caregiver   Arrange for needed discharge resources and transportation as appropriate     Problem: Nutrition Deficit:  Goal: Optimize nutritional status  Outcome: Progressing

## 2025-03-02 NOTE — DISCHARGE SUMMARY
Hospital Medicine Discharge Summary    Patient: Muriel Thomas   : 1950     Hospital:  [facility]  Admit Date: 2025   Discharge Date:   25  Disposition:  [x]Home   []HHC  []SNF  []Acute Rehab  []LTAC  []Hospice  Code status:  [x]Full  []DNR/CCA  []Limited (DNR/CCA with Do Not Intubate)  []DNRCC  Condition at Discharge: Stable  Primary Care Provider: McGilligan, Becky, MD    Admitting Provider: Chilo Farley DO  Discharge Provider: Ugo Burr MD     Discharge Diagnoses:      Active Hospital Problems    Diagnosis     Community acquired bacterial pneumonia [J15.9]        Presenting Admission History:      75 y.o. female with past medical history of anxiety, depression, hyperlipidemia, hypothyroidism presented to emergency department with chief complaint of cough, congestion. Patient states that she has had symptoms for the last 2 weeks. Her son and grandson were recently diagnosed with influenza A and she thought that the symptoms she was having was because of that. After symptoms did not resolve after a week she was evaluated and was started on Augmentin and steroids for possible bronchitis. Patient states that she continue to take these medications as directed but was not getting any better. When patient was too weak to complete ADLs and did not have an appetite her son had her come into emergency department for evaluation. Patient does state that she has had intermittent sweating but has not recorded a temperature. Does endorse some headache and dizziness. States that when she has a significant cough that there is chest pain. Denies any GI,  symptoms. Denies lower extremity edema.     Assessment/Plan:      Acute hypoxic respiratory failure 2/2 pneumonia  - pulm consulted  - suspect gram positive  - completed ceftriaxone, azithromycin  - s/p bronch, BAL NGTD  - S/P IV lasix  - started on new home O2    Hypokalemia  - repleted    HLD  - continue statin    Anxiety,  unremarkable.     Mild linear bibasilar opacity, favor atelectasis or scarring, however pneumonia is difficult to exclude. Probable small left pleural effusion. Electronically signed by Dominic Messina MD      Consults:     IP CONSULT TO PULMONOLOGY    Labs:     No results for input(s): \"WBC\", \"HGB\", \"HCT\", \"PLT\" in the last 72 hours.  No results for input(s): \"NA\", \"K\", \"CL\", \"CO2\", \"BUN\", \"CREATININE\", \"CALCIUM\", \"MG\", \"PHOS\" in the last 72 hours.  No results for input(s): \"PROBNP\", \"TROPHS\" in the last 72 hours.  No results for input(s): \"LABA1C\" in the last 72 hours.  No results for input(s): \"AST\", \"ALT\", \"BILIDIR\", \"BILITOT\", \"ALKPHOS\" in the last 72 hours.  No results for input(s): \"INR\", \"LACTA\", \"TSH\" in the last 72 hours.    Urine Cultures: No results found for: \"LABURIN\"  Blood Cultures:   Lab Results   Component Value Date/Time    BC No Growth after 4 days of incubation. 02/22/2025 09:55 AM     Lab Results   Component Value Date/Time    BLOODCULT2 No Growth after 4 days of incubation. 02/22/2025 09:55 AM     Organism:   Lab Results   Component Value Date/Time    ORG Staphylococcus coagulase negative DNA Detected 02/20/2025 08:07 PM    ORG Staphylococcus coagulase-negative 02/20/2025 08:07 PM       Signed:    Ugo Burr MD

## 2025-03-11 NOTE — PROGRESS NOTES
Physician Progress Note      PATIENT:               JAVY JAMISON  CSN #:                  [id number]  :                       1950  ADMIT DATE:       2025 5:56 PM  DISCH DATE:        3/2/2025 3:01 PM  RESPONDING  PROVIDER #:        Ugo Burr MD          QUERY TEXT:    Patient admitted with Community acquired bacterial pneumonia. Documentation   reflects Sepsis in Internal medicine PN dated .  If possible, please   document in the progress notes and discharge summary if Sepsis was:    The medical record reflects the following:    Risk Factors: Pneumonia ,75yr old female    Clinical Indicators:    IM PN -Pneumonia.  Sepsis was present on arrival: leukocytosis, bandemia,   tachypnea.  Likely gram positive CAP w/ strep pneumonia.  Blood cultures,   throat culture, resp culture have been ordered.  Continue azithromycin,   ceftriaxone.  Encourage pulmonary toilet    IM PN -Pneumonia.  Sepsis was present on arrival: leukocytosis, bandemia,   tachypnea.  Likely gram positive CAP w/ strep pneumonia.  Blood cultures,   throat culture, resp culture have been ordered.  Continue azithromycin,   ceftriaxone    DS -Acute hypoxic respiratory failure 2/ pneumonia    WBC on - 13.7 , on -14.5  Band relative on - ,on -10 ,on -2 , on -22  HR on - 96, on - 99, on -94 , on   -106,100,96,93,91,97,94,93 , on -91 , on -97,91  RR ON -25,23,24,21  BLOOD CULTURE-Staphylococcus coagulase negative DNA Detected Abnormal    Treatment: ceftriaxone iv, azithromycin iv, Serial labs, Pulse oximetry   monitoring, Cultures    Thank you,  Katelyn Aguilera.  Options provided:  -- Sepsis confirmed after study  -- Sepsis ruled out after study  -- Other - I will add my own diagnosis  -- Disagree - Not applicable / Not valid  -- Disagree - Clinically unable to determine / Unknown  -- Refer to Clinical Documentation Reviewer    PROVIDER RESPONSE TEXT:    Sepsis

## (undated) DEVICE — TUBING, SUCTION, 3/16" X 12', STRAIGHT: Brand: MEDLINE

## (undated) DEVICE — KIT COLON W/ 1.1OZ LUB AND 2 END

## (undated) DEVICE — BOWL MED M 16OZ PLAS CAP GRAD

## (undated) DEVICE — CANNULA,OXY,ADULT,SUPERSOFT,W/7'TUB,SC: Brand: MEDLINE INDUSTRIES, INC.

## (undated) DEVICE — TRAP,MUCUS SPECIMEN, 80CC: Brand: MEDLINE

## (undated) DEVICE — SINGLE USE SUCTION VALVE MAJ-209: Brand: SINGLE USE SUCTION VALVE (STERILE)

## (undated) DEVICE — CONMED SCOPE SAVER BITE BLOCK, 20X27 MM: Brand: SCOPE SAVER

## (undated) DEVICE — TUBING, SUCTION, 3/16" X 6', STRAIGHT: Brand: MEDLINE

## (undated) DEVICE — SYRINGE MED 10ML SLIP TIP BLNT FILL AND LUERLOCK DISP

## (undated) DEVICE — SINGLE USE BIOPSY VALVE MAJ-210: Brand: SINGLE USE BIOPSY VALVE (STERILE)

## (undated) DEVICE — YANKAUER,BULB TIP,W/O VENT,RIGID,STERILE: Brand: MEDLINE

## (undated) DEVICE — CONTAINER,SPEC,PNEUM TUBE,3OZ,STRL PATH: Brand: MEDLINE

## (undated) DEVICE — ELECTRODE,ECG,STRESS,FOAM,3PK: Brand: MEDLINE

## (undated) DEVICE — Z DISCONTINUED USE 2132653 SYRINGE MED 10ML POLYPR LUERSLIP TIP FLAT TOP W/O SFTY DISP

## (undated) DEVICE — GOWN, COVER, POLYCTD,KNITCF,OVRHD,BL,L: Brand: MEDLINE

## (undated) DEVICE — SYRINGE MED 50ML LUERLOCK TIP